# Patient Record
Sex: FEMALE | Race: WHITE | NOT HISPANIC OR LATINO | URBAN - NONMETROPOLITAN AREA
[De-identification: names, ages, dates, MRNs, and addresses within clinical notes are randomized per-mention and may not be internally consistent; named-entity substitution may affect disease eponyms.]

---

## 2017-01-18 NOTE — PATIENT DISCUSSION
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 NO CNV SEEN TODAY.  Recommend observation and continued close monitoring for progression to exudative AMD.

## 2017-01-18 NOTE — PROCEDURE NOTE: CLINICAL
PROCEDURE NOTE: Eylea 2mg OS. Diagnosis: Neovascular AMD with Active CNV. Anesthesia: Subconjunctival. Prep: Betadine Drops. Prior to injection, risks/benefits/alternatives discussed including infection, loss of vision, hemorrhage, cataract, glaucoma, retinal tears or detachment. The patient wished to proceed with treatment. Betadine prep was performed. Topical anesthesia was induced with Alcaine. Additional anesthesia was achieved using drop(s) or injection checked above. A drop of Povidone-iodine 5% ophthalmic solution was instilled over the injection site and in the inferior fornix. Using the syringe provided, Eylea 2.0mg in 0.05 cc was injected into the vitreous cavity. The needle was passed 3.0 mm posterior to the limbus in pseudophakic patients, and 3.5 mm posterior to the limbus in phakic patients. Patient tolerated procedure well. There were no complications. Injection time: 4:25 PM. The eye was irrigated with sterile irrigating solution. Post procedure instructions given. The patient was instructed to return for re-evaluation in approximately 4-12 weeks depending on his/her condition and was told to call immediately if vision decreases and/or if his/her eye becomes red, painful, and/or light sensitive. The patient was instructed to go to the emergency room or call 911 if unable to reach the doctor within an hour or two of trying or calling. The patient was instructed to use Artificial Tears q.i.d. p.r.n for comfort. Linnea Haynes

## 2017-01-18 NOTE — PATIENT DISCUSSION
Based on today's exam, diagnostic studies, and/or review of records, the determination was made for treatment today. EYLEA 2mg recommended TODAY AND AGAIN EVERY 10 WKS X 2 after discussion of benefits, risks and alternatives. The injection was given and tolerated well by the patient. Post-injection instructions were reviewed and understood by the patient. Procedure was performed without complications. Instructed to call immediately if any new distortion, blurring, decreased vision or eye pain.

## 2017-03-29 NOTE — PROCEDURE NOTE: CLINICAL
PROCEDURE NOTE: Eylea 2mg OS. Diagnosis: Neovascular AMD with Active CNV. Anesthesia: Topical. Prep: Betadine Drops. Prior to injection, risks/benefits/alternatives discussed including infection, loss of vision, hemorrhage, cataract, glaucoma, retinal tears or detachment. The patient wished to proceed with treatment. Betadine prep was performed. Topical anesthesia was induced with Alcaine. Additional anesthesia was achieved using drop(s) or injection checked above. A drop of Povidone-iodine 5% ophthalmic solution was instilled over the injection site and in the inferior fornix. Using the syringe provided, Eylea 2.0mg in 0.05 cc was injected into the vitreous cavity. The remainder of the Eylea in the single-use vial was then discarded in a medical waste disposal container. The needle was passed 3.0 mm posterior to the limbus in pseudophakic patients, and 3.5 mm posterior to the limbus in phakic patients. Patient tolerated procedure well. There were no complications. Injection time: 1246PM. The eye was irrigated with sterile irrigating solution. Post procedure instructions given. The patient was instructed to return for re-evaluation in approximately 4-12 weeks depending on his/her condition and was told to call immediately if vision decreases and/or if his/her eye becomes red, painful, and/or light sensitive. The patient was instructed to go to the emergency room or call 911 if unable to reach the doctor within an hour or two of trying or calling. The patient was instructed to use Artificial Tears q.i.d. p.r.n for comfort. Osmar Alanis

## 2017-06-07 NOTE — PROCEDURE NOTE: CLINICAL
PROCEDURE NOTE: Eylea 2mg OS. Diagnosis: Neovascular AMD with Active CNV. Anesthesia: Topical. Prep: Betadine Drops. Prior to injection, risks/benefits/alternatives discussed including infection, loss of vision, hemorrhage, cataract, glaucoma, retinal tears or detachment. The patient wished to proceed with treatment. Betadine prep was performed. Topical anesthesia was induced with Alcaine. Additional anesthesia was achieved using drop(s) or injection checked above. A drop of Povidone-iodine 5% ophthalmic solution was instilled over the injection site and in the inferior fornix. Using the syringe provided, Eylea 2.0mg in 0.05 cc was injected into the vitreous cavity. The remainder of the Eylea in the single-use vial was then discarded in a medical waste disposal container. The needle was passed 3.0 mm posterior to the limbus in pseudophakic patients, and 3.5 mm posterior to the limbus in phakic patients. Patient tolerated procedure well. There were no complications. Injection time: 4:00 PM. The eye was irrigated with sterile irrigating solution. Post procedure instructions given. The patient was instructed to return for re-evaluation in approximately 4-12 weeks depending on his/her condition and was told to call immediately if vision decreases and/or if his/her eye becomes red, painful, and/or light sensitive. The patient was instructed to go to the emergency room or call 911 if unable to reach the doctor within an hour or two of trying or calling. The patient was instructed to use Artificial Tears q.i.d. p.r.n for comfort. Mari Bales

## 2018-08-15 PROBLEM — H35.373: Noted: 2018-08-15

## 2018-08-15 PROBLEM — Z96.1: Noted: 2017-02-17

## 2018-08-15 PROBLEM — H34.11: Noted: 2017-02-17

## 2018-08-15 PROBLEM — E11.9: Noted: 2018-08-15

## 2019-08-16 ENCOUNTER — IMPORTED ENCOUNTER (OUTPATIENT)
Dept: URBAN - NONMETROPOLITAN AREA CLINIC 1 | Facility: CLINIC | Age: 79
End: 2019-08-16

## 2019-08-16 PROCEDURE — 92134 CPTRZ OPH DX IMG PST SGM RTA: CPT

## 2019-08-16 PROCEDURE — 92014 COMPRE OPH EXAM EST PT 1/>: CPT

## 2019-08-16 NOTE — PATIENT DISCUSSION
DM s NPDR -Stressed the importance of keeping blood sugars under control blood pressure under control and weight normalization and regular visits with PCP. -Explained the possible effects of poorly controlled diabetes and the damage that diabetes can cause to ocular health. -Patient to check HgbA1C.-Pt instructed to contact our office with any vision changes. 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 -Continue to monitor for now. Pt will notify us if any new symptoms develop.

## 2019-10-16 ENCOUNTER — OFFICE VISIT (OUTPATIENT)
Dept: UROLOGY | Age: 79
End: 2019-10-16

## 2019-10-16 VITALS
DIASTOLIC BLOOD PRESSURE: 76 MMHG | SYSTOLIC BLOOD PRESSURE: 185 MMHG | OXYGEN SATURATION: 95 % | HEIGHT: 61 IN | HEART RATE: 76 BPM | BODY MASS INDEX: 35.14 KG/M2

## 2019-10-16 DIAGNOSIS — N32.81 OAB (OVERACTIVE BLADDER): ICD-10-CM

## 2019-10-16 DIAGNOSIS — K62.3 INCOMPLETE RECTAL PROLAPSE: ICD-10-CM

## 2019-10-16 DIAGNOSIS — E66.01 SEVERE OBESITY (HCC): ICD-10-CM

## 2019-10-16 DIAGNOSIS — R30.0 DYSURIA: ICD-10-CM

## 2019-10-16 DIAGNOSIS — N81.6 RECTOCELE: ICD-10-CM

## 2019-10-16 DIAGNOSIS — K62.3 RP (RECTAL PROLAPSE): ICD-10-CM

## 2019-10-16 DIAGNOSIS — R35.0 FREQUENT URINATION: ICD-10-CM

## 2019-10-16 DIAGNOSIS — R35.1 NOCTURIA: Primary | ICD-10-CM

## 2019-10-16 LAB
BILIRUB UR QL STRIP: NEGATIVE
GLUCOSE UR-MCNC: NEGATIVE MG/DL
KETONES P FAST UR STRIP-MCNC: NEGATIVE MG/DL
PH UR STRIP: 5.5 [PH] (ref 4.6–8)
PROT UR QL STRIP: NORMAL
SP GR UR STRIP: 1.02 (ref 1–1.03)
UA UROBILINOGEN AMB POC: NORMAL (ref 0.2–1)
URINALYSIS CLARITY POC: CLEAR
URINALYSIS COLOR POC: YELLOW
URINE BLOOD POC: NORMAL
URINE LEUKOCYTES POC: NORMAL
URINE NITRITES POC: NEGATIVE

## 2019-10-16 RX ORDER — ALBUTEROL SULFATE 90 UG/1
1 AEROSOL, METERED RESPIRATORY (INHALATION)
COMMUNITY

## 2019-10-16 RX ORDER — ROSUVASTATIN CALCIUM 10 MG/1
10 TABLET, COATED ORAL
COMMUNITY

## 2019-10-16 NOTE — PROGRESS NOTES
Randall Partida 70 y.o. female     Ms. Maikol Howell seen today for evaluation of irritative voiding symptoms associated with bulging mass-effect in the region of introitus  Patient has history of recurrent UTIs with normal cystoscopic findings in 2019-Johnathan Yoon, [facility]    CT scan imaging of the abdomen and pelvis on 2019 shows normal kidneys ureters and bladder  IMPRESSION:  1. Copious stool suggests possible constipation; correlate with bowel   habits. 2. Large hiatus hernia. 3. Limited evaluation without IV contrast precludes definitive evaluation   of solid organs. Creatinine 0.7 in 2019    Colonoscopy in 2019 reportedly shows normal findings    Review of Systems:   CNS: No seizure syncope headaches dizziness or visual changes  Respiratory: No wheezing shortness of breath chest pain or coughing  Cardiovascular: Hypertension no angina no palpitations  Intestinal: GERD  Urinary: Urinary urgency frequency nocturia  Skeletal: Large joint arthritis  Endocrine: Diabetes  Other:                                                                                                      3 para 3  x3 max birth weight 8 pounds 3 ounces                                                                                                                  KIMBERLYN at age 41    Allergies: No Known Allergies   Medications:    Current Outpatient Medications   Medication Sig Dispense Refill    albuterol (PROAIR HFA) 90 mcg/actuation inhaler Take  by inhalation.  rosuvastatin (CRESTOR) 10 mg tablet Take 10 mg by mouth nightly.  ferrous sulfate 324 mg (65 mg iron) tablet Take  by mouth Daily (before breakfast).  insulin glargine (LANTUS U-100 INSULIN) 100 unit/mL injection by SubCUTAneous route nightly.  atenolol (TENORMIN) 25 mg tablet Take 25 mg by mouth daily.  furosemide (LASIX) 80 mg tablet Take  by mouth daily.       omeprazole (PRILOSEC) 40 mg capsule Take 40 mg by mouth daily.  HYDROcodone-acetaminophen (NORCO)  mg tablet Take 1 Tab by mouth.  warfarin (COUMADIN) 2 mg tablet Take 2 mg by mouth daily.  lisinopril (PRINIVIL, ZESTRIL) 10 mg tablet Take  by mouth daily.  fluticasone propion-salmeterol (ADVAIR DISKUS) 100-50 mcg/dose diskus inhaler Take 1 Puff by inhalation every twelve (12) hours.  adalimumab (HUMIRA PEN) 40 mg/0.8 mL injection pen by SubCUTAneous route once.  cholecalciferol, vitamin D3, (VITAMIN D3) 2,000 unit tab Take  by mouth.  ciprofloxacin HCl (CIPRO) 500 mg tablet Take 1 Tab by mouth two (2) times a day. 1 Tab 0    simvastatin (ZOCOR) 10 mg tablet Take  by mouth nightly.  tiotropium (SPIRIVA WITH HANDIHALER) 18 mcg inhalation capsule Take 1 Cap by inhalation daily.          Past Medical History:   Diagnosis Date    Asthma     CAD (coronary artery disease)     Diabetes ([facility] [address]     Headache     HTN (hypertension)     Hypercholesteremia     Leg cramps     on potassium lasix    Pulmonary emboli (HCC)     UTI (urinary tract infection)       Past Surgical History:   Procedure Laterality Date    HX BACK SURGERY      HX CARPAL TUNNEL RELEASE  04/22/2014    HX HYSTERECTOMY  1992    HX TUBAL LIGATION      laparoscopic     Social History     Socioeconomic History    Marital status: UNKNOWN     Spouse name: Not on file    Number of children: Not on file    Years of education: Not on file    Highest education level: Not on file   Occupational History    Not on file   Social Needs    Financial resource strain: Not on file    Food insecurity:     Worry: Not on file     Inability: Not on file    Transportation needs:     Medical: Not on file     Non-medical: Not on file   Tobacco Use    Smoking status: Former Smoker    Smokeless tobacco: Never Used   Substance and Sexual Activity    Alcohol use: Never     Frequency: Never    Drug use: Not on file    Sexual activity: Never   Lifestyle    Physical activity:     Days per week: Not on file     Minutes per session: Not on file    Stress: Not on file   Relationships    Social connections:     Talks on phone: Not on file     Gets together: Not on file     Attends [employer] service: Not on file     Active member of club or organization: Not on file     Attends meetings of clubs or organizations: Not on file     Relationship status: Not on file    Intimate partner violence:     Fear of current or ex partner: Not on file     Emotionally abused: Not on file     Physically abused: Not on file     Forced sexual activity: Not on file   Other Topics Concern    Not on file   Social History Narrative    Not on file      History reviewed. No pertinent family history. Physical Examination: Well-nourished mature female in no apparent distress-marked increased BMI    Abdomen is nontender   Back-no percussion CVA tenderness   Lower extremity motor and sensory function are normal  Speculum examination: Moderate sized rectocele with protrusion through introitus on Valsalva maneuver      urinalysis: +++hem negative nitritee/    Impression: Overactive bladder with  prolapsing rectocele        Plan: Referral to female urology-Dr. Rodriguez [facility]  -electronically signed-    PLEASE NOTE:  This document has been produced using voice recognition software. Unrecognized errors in transcription may be present.

## 2019-10-16 NOTE — PATIENT INSTRUCTIONS
Learning About Your Urinary System  What does your urinary system do? The urinary system is the network of organs and tubes that process and carry urine out of the body. The kidneys and ureters are called the upper urinary tract. The bladder and urethra are called the lower urinary tract. Your kidneys filter waste products and water from the blood to make urine. They also keep the proper balance of fluids and chemicals your body needs to work as it should. Tubes called ureters carry urine from the kidneys to the bladder. The bladder stores the urine. When the bladder is full, the urine leaves the body through another thin tube, called the urethra. What problems can happen with this system? Urinary problems include:  · A urinary tract infection, or UTI. This is an infection anywhere between the kidneys and the urethra. · Kidney stones. These are tiny stones that form in the kidneys. They can cause blood in the urine and severe pain. · Trouble urinating or not being able to urinate. This is often caused by an enlarged prostate in men. · Incontinence. This means you have trouble controlling the release of urine. Common symptoms of a urinary problem include:  · A burning feeling when you urinate. This is the most common symptom of a UTI. · An urgent need to urinate. · Blood in the urine. Your urine may look red, brown, or pink. · Incontinence. Treatment for urinary problems depends on the cause. Your doctor may treat an infection with an antibiotic. Or you may need a procedure to help a kidney stone pass. How can you prevent urinary problems? · Drink plenty of fluids, enough so that your urine is light yellow or clear like water. If you have kidney, heart, or liver disease and have to limit fluids, talk with your doctor before you increase the amount of fluids you drink. · Urinate when you need to. Tips for women  · Urinate right after you have sex. · Change sanitary pads often.   · Avoid douches, bubble baths, feminine hygiene sprays, and other feminine hygiene products that have deodorants. · After you use the toilet, wipe from front to back. Where can you learn more? Go to http://rubens-annmarie.info/. Enter I669 in the search box to learn more about \"Learning About Your Urinary System. \"  Current as of: December 19, 2018  Content Version: 12.2  © 3643-4663 [employer]. Care instructions adapted under license by [employer] (which disclaims liability or warranty for this information). If you have questions about a medical condition or this instruction, always ask your healthcare professional. [address] any warranty or liability for your use of this information.

## 2019-10-16 NOTE — PROGRESS NOTES
Ms. Kimber Stuart has a reminder for a \"due or due soon\" health maintenance. I have asked that she contact her primary care provider for follow-up on this health maintenance.

## 2019-10-17 ENCOUNTER — DOCUMENTATION ONLY (OUTPATIENT)
Dept: UROLOGY | Age: 79
End: 2019-10-17

## 2019-10-17 NOTE — PROGRESS NOTES
CT scan was canceled Per Dr. Canseco Brod he had found one from a couple months ago and said she did not need to get another one. Patient was notified.

## 2020-06-02 PROBLEM — K92.2 LOWER GI BLEED: Status: ACTIVE | Noted: 2020-06-02

## 2020-09-14 ENCOUNTER — IMPORTED ENCOUNTER (OUTPATIENT)
Dept: URBAN - NONMETROPOLITAN AREA CLINIC 1 | Facility: CLINIC | Age: 80
End: 2020-09-14

## 2020-09-14 PROBLEM — H35.373: Noted: 2020-10-12

## 2020-09-14 PROBLEM — H26.493: Noted: 2020-10-12

## 2020-09-14 PROBLEM — Z96.1: Noted: 2020-10-12

## 2020-09-14 PROBLEM — H34.11: Noted: 2020-10-12

## 2020-09-14 PROBLEM — E11.9: Noted: 2020-09-14

## 2020-09-14 PROCEDURE — 92014 COMPRE OPH EXAM EST PT 1/>: CPT

## 2020-09-14 NOTE — PATIENT DISCUSSION
DM s DR-Stressed the importance of keeping blood sugars under control blood pressure under control and weight normalization and regular visits with PCP. -Explained the possible effects of poorly controlled diabetes and the damage that diabetes can cause to ocular health. -Patient to check HgbA1C.-Pt instructed to contact our office with any vision changes. Old CRAO Discussed with patient continue to monitorEpiretinal membraneDiscussed findings of exam in detail with the patient. Discussed the signs of worsening of the disease. PC IOL OU Stable open OD PCO OSContinue to monitor; Dr's Notes:  Teagan Matson

## 2021-05-14 ENCOUNTER — IMPORTED ENCOUNTER (OUTPATIENT)
Dept: URBAN - NONMETROPOLITAN AREA CLINIC 1 | Facility: CLINIC | Age: 81
End: 2021-05-14

## 2021-05-14 PROBLEM — E11.9: Noted: 2021-05-14

## 2021-05-14 PROBLEM — H26.493: Noted: 2021-05-14

## 2021-05-14 PROBLEM — H35.373: Noted: 2021-05-14

## 2021-05-14 PROBLEM — Z96.1: Noted: 2021-05-14

## 2021-05-14 PROBLEM — H34.11: Noted: 2021-05-14

## 2021-05-14 PROBLEM — H16.142: Noted: 2021-05-14

## 2021-05-14 PROCEDURE — 92012 INTRM OPH EXAM EST PATIENT: CPT

## 2021-05-14 NOTE — PATIENT DISCUSSION
Punctate Keratitis OS-  discussed findings w/patient-  start Pred Forte TID OS x 1 week then RTC before starting taper-  continue Refresh BID OU-  RTC 1 week or prn; 's Notes:  Antione Bridges

## 2021-05-21 ENCOUNTER — IMPORTED ENCOUNTER (OUTPATIENT)
Dept: URBAN - NONMETROPOLITAN AREA CLINIC 1 | Facility: CLINIC | Age: 81
End: 2021-05-21

## 2021-05-21 PROCEDURE — 99212 OFFICE O/P EST SF 10 MIN: CPT

## 2021-05-21 NOTE — PATIENT DISCUSSION
Punctate Keratitis OS-  discussed findings w/patient-  much improved at this time-  taper PF 1% to BID OS x 1 week then QD OS x 1 week then d/c-  RTC as scheduled or prn

## 2021-09-22 ENCOUNTER — IMPORTED ENCOUNTER (OUTPATIENT)
Dept: URBAN - NONMETROPOLITAN AREA CLINIC 1 | Facility: CLINIC | Age: 81
End: 2021-09-22

## 2021-09-22 PROBLEM — H16.223: Noted: 2021-09-22

## 2021-09-22 PROBLEM — H35.373: Noted: 2021-09-22

## 2021-09-22 PROBLEM — H26.493: Noted: 2021-09-22

## 2021-09-22 PROBLEM — H34.11: Noted: 2021-09-22

## 2021-09-22 PROBLEM — E11.9: Noted: 2021-09-22

## 2021-09-22 PROBLEM — Z96.1: Noted: 2021-09-22

## 2021-09-22 PROBLEM — E11.9: Noted: 2021-10-26

## 2021-09-22 PROBLEM — H26.492: Noted: 2021-12-07

## 2021-09-22 PROBLEM — H26.493: Noted: 2021-10-26

## 2021-09-22 PROCEDURE — 92014 COMPRE OPH EXAM EST PT 1/>: CPT

## 2021-09-22 PROCEDURE — 92015 DETERMINE REFRACTIVE STATE: CPT

## 2021-09-22 NOTE — PATIENT DISCUSSION
KATINA mendoza Retinopathy -  discussed findings w/patient -  stressed importance of maintaining strict blood sugar control -  no BDR noted -  continue to monitor Pseudophakia OU -  both intraocular lenses are stable -  open capsule OD -  PCO noted OS order BAT at next visit -  continue to monitor ERM OU -  discussed findings w/patient -  discussed signs and symptoms associated -  order Mac OCT at next visit -  continue to monitor Hyperopia OS/Astigmatism OS w/PResbyopia OU -  discussed findings w/patient -  new spectacle Rx issued -  continue to monitor MJ OU -  discussed findings w/patient -  discussed signs and symptoms associated -  continue Refresh PRN OU -  continue to monitor; Dr's Notes: s/p NICHOLE ODMR 9/22/2021DFE 9/22/2021

## 2021-10-26 ENCOUNTER — IMPORTED ENCOUNTER (OUTPATIENT)
Dept: URBAN - NONMETROPOLITAN AREA CLINIC 1 | Facility: CLINIC | Age: 81
End: 2021-10-26

## 2021-10-26 PROCEDURE — 99214 OFFICE O/P EST MOD 30 MIN: CPT

## 2021-10-26 NOTE — PATIENT DISCUSSION
PCO-Explained PCO and RBAs of YAG Capsulotomy to pt. -Pt elects to proceed. YAG Caps OS n/a. Pt has bad claustophobia & did not want to wait to do YAG today.  Pt to RTC for n/a YAG PC OS NOTES BELOW FROM PREVIOUS DM sans Retinopathy -  discussed findings w/patient -  stressed importance of maintaining strict blood sugar control -  no BDR noted -  continue to monitor Pseudophakia OU -  both intraocular lenses are stable -  open capsule OD -  continue to monitor ERM OU -  discussed findings w/patient -  discussed signs and symptoms associated -  order Mac OCT at next visit -  continue to monitor Hyperopia OS/Astigmatism OS w/PResbyopia OU -  discussed findings w/patient -  new spectacle Rx issued -  continue to monitor MJ OU -  discussed findings w/patient -  discussed signs and symptoms associated -  continue Refresh PRN OU -  continue to monitor; Dr's Notes: s/p DANIELLEO ODMR 9/22/2021DFE 9/22/2021

## 2021-11-30 ENCOUNTER — PREPPED CHART (OUTPATIENT)
Dept: URBAN - NONMETROPOLITAN AREA CLINIC 4 | Facility: CLINIC | Age: 81
End: 2021-11-30

## 2021-11-30 ENCOUNTER — IMPORTED ENCOUNTER (OUTPATIENT)
Dept: URBAN - NONMETROPOLITAN AREA CLINIC 1 | Facility: CLINIC | Age: 81
End: 2021-11-30

## 2021-11-30 PROCEDURE — 66821 AFTER CATARACT LASER SURGERY: CPT

## 2021-11-30 NOTE — PATIENT DISCUSSION
PCO-Explained PCO and RBAs of YAG Capsulotomy to pt. -Pt elects to proceed.  YAG Caps OS todayconsent signed & obtained prior to procedure NOTES BELOW FROM PREVIOUS DM reji Retinopathy -  discussed findings w/patient -  stressed importance of maintaining strict blood sugar control -  no BDR noted -  continue to monitor Pseudophakia OU -  both intraocular lenses are stable -  open capsule OD -  continue to monitor ERM OU -  discussed findings w/patient -  discussed signs and symptoms associated -  order Mac OCT at next visit -  continue to monitor Hyperopia OS/Astigmatism OS w/PResbyopia OU -  discussed findings w/patient -  new spectacle Rx issued -  continue to monitor MJ OU -  discussed findings w/patient -  discussed signs and symptoms associated -  continue Refresh PRN OU -  continue to monitor; 's Notes: s/p NICHOLE ODMR 9/22/2021DFE 9/22/2021

## 2022-04-04 ASSESSMENT — VISUAL ACUITY
OS_PH: 20/30-2
OS_SC: 20/60+1

## 2022-04-04 ASSESSMENT — TONOMETRY
OS_IOP_MMHG: 14
OD_IOP_MMHG: 12

## 2022-04-06 ENCOUNTER — ESTABLISHED PATIENT (OUTPATIENT)
Dept: URBAN - NONMETROPOLITAN AREA CLINIC 4 | Facility: CLINIC | Age: 82
End: 2022-04-06

## 2022-04-06 DIAGNOSIS — H34.11: ICD-10-CM

## 2022-04-06 DIAGNOSIS — E11.9: ICD-10-CM

## 2022-04-06 DIAGNOSIS — H35.373: ICD-10-CM

## 2022-04-06 DIAGNOSIS — H52.02: ICD-10-CM

## 2022-04-06 DIAGNOSIS — H52.4: ICD-10-CM

## 2022-04-06 PROCEDURE — 99213 OFFICE O/P EST LOW 20 MIN: CPT

## 2022-04-06 PROCEDURE — 92134 CPTRZ OPH DX IMG PST SGM RTA: CPT

## 2022-04-06 PROCEDURE — 92015 DETERMINE REFRACTIVE STATE: CPT

## 2022-04-06 ASSESSMENT — VISUAL ACUITY: OS_SC: 20/70

## 2022-04-06 ASSESSMENT — TONOMETRY
OS_IOP_MMHG: 12
OD_IOP_MMHG: 14

## 2022-04-15 ASSESSMENT — VISUAL ACUITY
OS_SC: 20/40-1
OS_SC: 20/70
OS_CC: 20/70+2
OS_SC: 20/70
OS_GLARE: 20/100
OS_SC: 20/50
OU_CC: 20/20
OS_GLARE: 20/100
OU_SC: 20/70
OU_CC: 20/20
OS_PH: 20/30-2
OU_SC: 20/50
OS_PH: 20/60

## 2022-04-15 ASSESSMENT — TONOMETRY
OD_IOP_MMHG: 12
OS_IOP_MMHG: 14
OD_IOP_MMHG: 12
OS_IOP_MMHG: 14
OS_IOP_MMHG: 12
OS_IOP_MMHG: 14
OS_IOP_MMHG: 10
OD_IOP_MMHG: 16
OS_IOP_MMHG: 14
OD_IOP_MMHG: 13
OD_IOP_MMHG: 12
OD_IOP_MMHG: 10

## 2022-09-27 ENCOUNTER — HOSPITAL ENCOUNTER (INPATIENT)
Age: 82
LOS: 6 days | Discharge: HOME HEALTH | DRG: 682 | End: 2022-10-03
Attending: INTERNAL MEDICINE | Admitting: INTERNAL MEDICINE
Payer: MEDICARE

## 2022-09-27 DIAGNOSIS — R06.02 SHORTNESS OF BREATH: Primary | ICD-10-CM

## 2022-09-27 PROBLEM — I50.9 ACUTE CHF (CONGESTIVE HEART FAILURE) (HCC): Status: ACTIVE | Noted: 2022-09-27

## 2022-09-27 PROBLEM — N17.9 AKI (ACUTE KIDNEY INJURY) (HCC): Status: ACTIVE | Noted: 2022-09-27

## 2022-09-27 LAB
ANION GAP SERPL CALC-SCNC: 5 MMOL/L (ref 3–18)
APTT PPP: 32.4 SEC (ref 23–36.4)
BASOPHILS # BLD: 0 K/UL (ref 0–0.1)
BASOPHILS NFR BLD: 0 % (ref 0–2)
BUN SERPL-MCNC: 70 MG/DL (ref 7–18)
BUN/CREAT SERPL: 23 (ref 12–20)
CALCIUM SERPL-MCNC: 9.2 MG/DL (ref 8.5–10.1)
CHLORIDE SERPL-SCNC: 104 MMOL/L (ref 100–111)
CO2 SERPL-SCNC: 25 MMOL/L (ref 21–32)
CREAT SERPL-MCNC: 2.98 MG/DL (ref 0.6–1.3)
DIFFERENTIAL METHOD BLD: ABNORMAL
EOSINOPHIL # BLD: 0.3 K/UL (ref 0–0.4)
EOSINOPHIL NFR BLD: 3 % (ref 0–5)
ERYTHROCYTE [DISTWIDTH] IN BLOOD BY AUTOMATED COUNT: 14.1 % (ref 11.6–14.5)
EST. AVERAGE GLUCOSE BLD GHB EST-MCNC: 128 MG/DL
GLUCOSE BLD STRIP.AUTO-MCNC: 133 MG/DL (ref 70–110)
GLUCOSE SERPL-MCNC: 152 MG/DL (ref 74–99)
HBA1C MFR BLD: 6.1 % (ref 4.2–5.6)
HCT VFR BLD AUTO: 41.8 % (ref 35–45)
HGB BLD-MCNC: 13.1 G/DL (ref 12–16)
IMM GRANULOCYTES # BLD AUTO: 0 K/UL (ref 0–0.04)
IMM GRANULOCYTES NFR BLD AUTO: 0 % (ref 0–0.5)
LYMPHOCYTES # BLD: 1.6 K/UL (ref 0.9–3.6)
LYMPHOCYTES NFR BLD: 21 % (ref 21–52)
MAGNESIUM SERPL-MCNC: 2.2 MG/DL (ref 1.6–2.6)
MCH RBC QN AUTO: 29.8 PG (ref 24–34)
MCHC RBC AUTO-ENTMCNC: 31.3 G/DL (ref 31–37)
MCV RBC AUTO: 95.2 FL (ref 78–100)
MONOCYTES # BLD: 1.2 K/UL (ref 0.05–1.2)
MONOCYTES NFR BLD: 16 % (ref 3–10)
NEUTS SEG # BLD: 4.4 K/UL (ref 1.8–8)
NEUTS SEG NFR BLD: 59 % (ref 40–73)
NRBC # BLD: 0 K/UL (ref 0–0.01)
NRBC BLD-RTO: 0 PER 100 WBC
PLATELET # BLD AUTO: 247 K/UL (ref 135–420)
PMV BLD AUTO: 9.8 FL (ref 9.2–11.8)
POTASSIUM SERPL-SCNC: 5.1 MMOL/L (ref 3.5–5.5)
RBC # BLD AUTO: 4.39 M/UL (ref 4.2–5.3)
SODIUM SERPL-SCNC: 134 MMOL/L (ref 136–145)
WBC # BLD AUTO: 7.4 K/UL (ref 4.6–13.2)

## 2022-09-27 PROCEDURE — 77030018842 HC SOL IRR SOD CL 9% BAXT -A

## 2022-09-27 PROCEDURE — 74011000250 HC RX REV CODE- 250: Performed by: INTERNAL MEDICINE

## 2022-09-27 PROCEDURE — 80048 BASIC METABOLIC PNL TOTAL CA: CPT

## 2022-09-27 PROCEDURE — 99223 1ST HOSP IP/OBS HIGH 75: CPT | Performed by: INTERNAL MEDICINE

## 2022-09-27 PROCEDURE — 65270000046 HC RM TELEMETRY

## 2022-09-27 PROCEDURE — 2709999900 HC NON-CHARGEABLE SUPPLY

## 2022-09-27 PROCEDURE — 82962 GLUCOSE BLOOD TEST: CPT

## 2022-09-27 PROCEDURE — 36415 COLL VENOUS BLD VENIPUNCTURE: CPT

## 2022-09-27 PROCEDURE — 74011250637 HC RX REV CODE- 250/637: Performed by: INTERNAL MEDICINE

## 2022-09-27 PROCEDURE — 85025 COMPLETE CBC W/AUTO DIFF WBC: CPT

## 2022-09-27 PROCEDURE — 94761 N-INVAS EAR/PLS OXIMETRY MLT: CPT

## 2022-09-27 PROCEDURE — 74011250636 HC RX REV CODE- 250/636: Performed by: INTERNAL MEDICINE

## 2022-09-27 PROCEDURE — 83735 ASSAY OF MAGNESIUM: CPT

## 2022-09-27 PROCEDURE — 74011636637 HC RX REV CODE- 636/637: Performed by: INTERNAL MEDICINE

## 2022-09-27 PROCEDURE — 94640 AIRWAY INHALATION TREATMENT: CPT

## 2022-09-27 PROCEDURE — 85730 THROMBOPLASTIN TIME PARTIAL: CPT

## 2022-09-27 PROCEDURE — 83036 HEMOGLOBIN GLYCOSYLATED A1C: CPT

## 2022-09-27 RX ORDER — SODIUM CHLORIDE 0.9 % (FLUSH) 0.9 %
5-40 SYRINGE (ML) INJECTION AS NEEDED
Status: DISCONTINUED | OUTPATIENT
Start: 2022-09-27 | End: 2022-10-03 | Stop reason: HOSPADM

## 2022-09-27 RX ORDER — ROSUVASTATIN CALCIUM 10 MG/1
10 TABLET, COATED ORAL
Status: DISCONTINUED | OUTPATIENT
Start: 2022-09-27 | End: 2022-10-03 | Stop reason: HOSPADM

## 2022-09-27 RX ORDER — HEPARIN SODIUM 10000 [USP'U]/100ML
18-36 INJECTION, SOLUTION INTRAVENOUS
Status: DISCONTINUED | OUTPATIENT
Start: 2022-09-27 | End: 2022-09-28

## 2022-09-27 RX ORDER — ACETAMINOPHEN 325 MG/1
650 TABLET ORAL
Status: DISCONTINUED | OUTPATIENT
Start: 2022-09-27 | End: 2022-10-03 | Stop reason: HOSPADM

## 2022-09-27 RX ORDER — INSULIN LISPRO 100 [IU]/ML
INJECTION, SOLUTION INTRAVENOUS; SUBCUTANEOUS
Status: DISCONTINUED | OUTPATIENT
Start: 2022-09-27 | End: 2022-10-03 | Stop reason: HOSPADM

## 2022-09-27 RX ORDER — HYDRALAZINE HYDROCHLORIDE 50 MG/1
50 TABLET, FILM COATED ORAL 3 TIMES DAILY
Status: DISCONTINUED | OUTPATIENT
Start: 2022-09-27 | End: 2022-10-03 | Stop reason: HOSPADM

## 2022-09-27 RX ORDER — PROMETHAZINE HYDROCHLORIDE 12.5 MG/1
12.5 TABLET ORAL
Status: DISCONTINUED | OUTPATIENT
Start: 2022-09-27 | End: 2022-10-03 | Stop reason: HOSPADM

## 2022-09-27 RX ORDER — HYDRALAZINE HYDROCHLORIDE 50 MG/1
50 TABLET, FILM COATED ORAL 3 TIMES DAILY
COMMUNITY
Start: 2022-09-16

## 2022-09-27 RX ORDER — IPRATROPIUM BROMIDE AND ALBUTEROL SULFATE 2.5; .5 MG/3ML; MG/3ML
3 SOLUTION RESPIRATORY (INHALATION)
Status: DISCONTINUED | OUTPATIENT
Start: 2022-09-27 | End: 2022-10-03 | Stop reason: HOSPADM

## 2022-09-27 RX ORDER — ATENOLOL 25 MG/1
25 TABLET ORAL DAILY
Status: DISCONTINUED | OUTPATIENT
Start: 2022-09-28 | End: 2022-10-03 | Stop reason: HOSPADM

## 2022-09-27 RX ORDER — ARFORMOTEROL TARTRATE 15 UG/2ML
15 SOLUTION RESPIRATORY (INHALATION)
Status: DISCONTINUED | OUTPATIENT
Start: 2022-09-27 | End: 2022-09-29

## 2022-09-27 RX ORDER — AMLODIPINE BESYLATE 10 MG/1
10 TABLET ORAL DAILY
COMMUNITY
Start: 2022-09-16 | End: 2022-10-03

## 2022-09-27 RX ORDER — POLYETHYLENE GLYCOL 3350 17 G/17G
17 POWDER, FOR SOLUTION ORAL DAILY PRN
Status: DISCONTINUED | OUTPATIENT
Start: 2022-09-27 | End: 2022-10-03 | Stop reason: HOSPADM

## 2022-09-27 RX ORDER — MAGNESIUM SULFATE 100 %
4 CRYSTALS MISCELLANEOUS AS NEEDED
Status: DISCONTINUED | OUTPATIENT
Start: 2022-09-27 | End: 2022-10-03 | Stop reason: HOSPADM

## 2022-09-27 RX ORDER — BUDESONIDE 0.5 MG/2ML
500 INHALANT ORAL
Status: DISCONTINUED | OUTPATIENT
Start: 2022-09-27 | End: 2022-09-29

## 2022-09-27 RX ORDER — SODIUM CHLORIDE 0.9 % (FLUSH) 0.9 %
5-40 SYRINGE (ML) INJECTION EVERY 8 HOURS
Status: DISCONTINUED | OUTPATIENT
Start: 2022-09-27 | End: 2022-10-03 | Stop reason: HOSPADM

## 2022-09-27 RX ORDER — ONDANSETRON 2 MG/ML
4 INJECTION INTRAMUSCULAR; INTRAVENOUS
Status: DISCONTINUED | OUTPATIENT
Start: 2022-09-27 | End: 2022-10-03 | Stop reason: HOSPADM

## 2022-09-27 RX ORDER — ISOSORBIDE DINITRATE 20 MG/1
20 TABLET ORAL 3 TIMES DAILY
COMMUNITY
Start: 2022-09-16

## 2022-09-27 RX ORDER — PANTOPRAZOLE SODIUM 40 MG/1
40 TABLET, DELAYED RELEASE ORAL
Status: DISCONTINUED | OUTPATIENT
Start: 2022-09-28 | End: 2022-10-03 | Stop reason: HOSPADM

## 2022-09-27 RX ORDER — ISOSORBIDE DINITRATE 10 MG/1
20 TABLET ORAL 3 TIMES DAILY
Status: DISCONTINUED | OUTPATIENT
Start: 2022-09-27 | End: 2022-10-03 | Stop reason: HOSPADM

## 2022-09-27 RX ORDER — INSULIN GLARGINE 100 [IU]/ML
15 INJECTION, SOLUTION SUBCUTANEOUS 2 TIMES DAILY
Status: DISCONTINUED | OUTPATIENT
Start: 2022-09-27 | End: 2022-09-28

## 2022-09-27 RX ADMIN — HYDRALAZINE HYDROCHLORIDE 50 MG: 50 TABLET, FILM COATED ORAL at 22:02

## 2022-09-27 RX ADMIN — BUDESONIDE 500 MCG: 0.5 SUSPENSION RESPIRATORY (INHALATION) at 20:35

## 2022-09-27 RX ADMIN — SODIUM CHLORIDE, PRESERVATIVE FREE 10 ML: 5 INJECTION INTRAVENOUS at 22:04

## 2022-09-27 RX ADMIN — Medication 15 UNITS: at 22:07

## 2022-09-27 RX ADMIN — HEPARIN SODIUM 18 UNITS/KG/HR: 10000 INJECTION, SOLUTION INTRAVENOUS at 21:50

## 2022-09-27 RX ADMIN — ROSUVASTATIN CALCIUM 10 MG: 10 TABLET, FILM COATED ORAL at 22:02

## 2022-09-27 RX ADMIN — IPRATROPIUM BROMIDE AND ALBUTEROL SULFATE 3 ML: .5; 3 SOLUTION RESPIRATORY (INHALATION) at 20:35

## 2022-09-27 RX ADMIN — ISOSORBIDE DINITRATE 20 MG: 10 TABLET ORAL at 22:02

## 2022-09-27 RX ADMIN — ARFORMOTEROL TARTRATE 15 MCG: 15 SOLUTION RESPIRATORY (INHALATION) at 20:35

## 2022-09-27 NOTE — H&P
History & Physical    Patient: Ryan Kwong MRN: [id number]  CSN: [id number]    YOB: 1940  Age: 80 y.o. Sex: female      DOA: 9/27/2022  CC: shortness of breath    PCP: Hollie Rodriguez MD       HPI:     Ryan Kwong is a 80 y.o. female with medical co-morbidities including hypertension, hyperlipidemia, chronic CHF unspecified, COPD, h/o PE/DVT on Eliquis, current IVC filter, type 2 diabetes mellitus, psoriasis on Humira, presented to [facility] with worsening shortness of breath. According to her, she had shortness of breath which started a few weeks ago that seem to have worsened in the last few days. Her shortness of breath seemed to be worse when she exerted herself. She has no chest pain, she has no associated leg swelling. She stated that she has been compliant with all her medications. However, she has noted that the bottle of Lasix was hidden behind the  the last 3 weeks and she has not realized it was missing, Hence, her Lasix dose was not filled in her medication box. Otherwise, she have not noticed any changes in her leg swelling or urinary output. She does follow-up with her nephrologist regularly with indication that her kidney function has been stable. In the ED at the [facility], she was found to have elevated creatinine (4.13) with a depressed GFR, elevated BUN (67), elevated potassium. Normal troponin. Respiratory panel was negative. CBC was within normal limit. Chest x-ray with pulmonary vascular congestion. Duplex of her legs showed nonoccluding DVT within the left popliteal vein. She was given heparin drip. She was planned for transfer to [facility] for further evaluation of her CHF and REMY. She was given Lasix at the advice of the transferring MD.  She has sent tolerated well and improve in her respiratory symptom.         Review of Systems  GENERAL: No fever, No chill, + malaise   HEENT: No change in vision, no sore throat or sinus congestion. NECK: No pain or stiffness. PULMONARY:++ Shortness of breath, no cough or wheeze. Cardiovascular: no pnd / orthopnea, no Chest Pain  GASTROINTESTINAL: No abd pain, No nausea/vomiting, No diarrhea, No melena or bright red blood per rectum. GENITOURINARY: No urinary frequency, No urgency or pain with urination. MUSCULOSKELETAL: No joint or muscle pain, no back pain, no recent trauma. DERMATOLOGIC: No rash, no itching, no lesions. ENDOCRINE: No polyuria, polydipsia,  No recent change in weight. HEMATOLOGICAL: No easy bruising or bleeding. NEUROLOGIC: No headache, No seizures, No generalized weakness         Past Medical History:   Diagnosis Date    Asthma     CAD (coronary artery disease)     Diabetes ([address]     Headache     HTN (hypertension)     Hypercholesteremia     Leg cramps     on potassium lasix    Pulmonary emboli (HCC)     UTI (urinary tract infection)        Past Surgical History:   Procedure Laterality Date    COLONOSCOPY N/A 6/5/2020    COLONOSCOPY WITH BXS performed by Arin Mancia MD at [address] [address]  04/22/2014    HX HYSTERECTOMY  1992    HX TUBAL LIGATION      laparoscopic    IR [facility] IVC FILTER  6/10/2020       No family history on file. Social History     Socioeconomic History    Marital status:    Tobacco Use    Smoking status: Former    Smokeless tobacco: Never   Substance and Sexual Activity    Alcohol use: Never    Sexual activity: Never       Prior to Admission medications    Medication Sig Start Date End Date Taking? Authorizing Provider   amLODIPine (NORVASC) 10 mg tablet Take 10 mg by mouth daily. 9/16/22  Yes Provider, Historical   hydrALAZINE (APRESOLINE) 50 mg tablet Take 50 mg by mouth three (3) times daily. 9/16/22  Yes Provider, Historical   isosorbide dinitrate (ISORDIL) 20 mg tablet Take 20 mg by mouth three (3) times daily.  9/16/22  Yes Provider, Historical   apixaban (ELIQUIS) 5 mg tablet Take 1 Tab by mouth two (2) times a day. 6/16/20  Yes Yang Kumar MD   albuterol-ipratropium (DUO-NEB) 2.5 mg-0.5 mg/3 ml nebu 3 mL by Nebulization route every six (6) hours as needed for Wheezing. 6/16/20  Yes Yang Kumar MD   ergocalciferol (ERGOCALCIFEROL) 50,000 unit capsule Vitamin D2 1,250 mcg (50,000 unit) capsule   Yes Provider, Historical   albuterol (PROVENTIL HFA, VENTOLIN HFA, PROAIR HFA) 90 mcg/actuation inhaler Take 1 Puff by inhalation every six (6) hours as needed. Yes Provider, Historical   rosuvastatin (CRESTOR) 10 mg tablet Take 10 mg by mouth nightly. Yes Provider, Historical   insulin glargine (LANTUS) 100 unit/mL injection by SubCUTAneous route nightly. 26 units in AM, 15 units in PM   Yes Provider, Historical   atenolol (TENORMIN) 25 mg tablet Take 25 mg by mouth daily. Yes Provider, Historical   furosemide (LASIX) 40 mg tablet Take 40 mg by mouth daily. Yes Provider, Historical   omeprazole (PRILOSEC) 20 mg capsule Take 20 mg by mouth daily. Yes Provider, Historical   HYDROcodone-acetaminophen (NORCO)  mg tablet Take 1 Tablet by mouth every eight (8) hours as needed. Yes Provider, Historical   lisinopril (PRINIVIL, ZESTRIL) 10 mg tablet Take  by mouth daily. Yes Provider, Historical   adalimumab (HUMIRA) 40 mg/0.8 mL injection pen 40 mg by SubCUTAneous route every seven (7) days. Yes Provider, Historical   tiotropium (SPIRIVA) 18 mcg inhalation capsule Take 1 Cap by inhalation daily. Yes Provider, Historical   FREESTYLE LITE STRIPS strip  11/30/19   Provider, Historical   FREESTYLE LANCETS 28 gauge misc  11/30/19   Provider, Historical   fluticasone propion-salmeteroL (ADVAIR/WIXELA) 100-50 mcg/dose diskus inhaler Take 1 Puff by inhalation every twelve (12) hours.     Provider, Historical       No Known Allergies           Physical Exam:      Visit Vitals  BP (!) 148/75 (BP 1 Location: Right lower arm, BP Patient Position: At rest) Pulse 70   Temp 97.8 °F (36.6 °C)   Resp 17   Ht 5' 3\" (1.6 m)   Wt 79.3 kg (174 lb 13.2 oz)   SpO2 99%   BMI 30.97 kg/m²       Physical Exam:  Tele:   General:  Cooperative, Not in acute distress, speaks in short sentence while in bed  HEENT: PERRL, EOMI, supple neck, + JVD, dry oral mucosa  Cardiovascular: S1S2 regular, no rub/gallop   Pulmonary: air entry bilaterally, no wheezing, ++ crackle  GI:  Soft, non tender, non distended, +bs, no guarding   Extremities: + Pedal edema, +distal pulses appreciated   Neuro: AOx3, moving all extremities, no gross deficit. Lab/Data Review:  Labs: Results:       Chemistry Recent Labs     09/27/22 1908   *   *   K 5.1      CO2 25   BUN 70*   CREA 2.98*   CA 9.2   AGAP 5   BUCR 23*      CBC w/Diff Recent Labs     09/27/22 1908   WBC 7.4   RBC 4.39   HGB 13.1   HCT 41.8      GRANS 59   LYMPH 21   EOS 3      Coagulation Recent Labs     09/27/22 1908   APTT 32.4       Iron/Ferritin No results for input(s): IRON in the last 72 hours. No lab exists for component: TIBCCALC   BNP No results for input(s): BNPP in the last 72 hours. Cardiac Enzymes No results for input(s): CPK, CKND1, ISRAEL in the last 72 hours. No lab exists for component: CKRMB, TROIP   Liver Enzymes No results for input(s): TP, ALB, TBIL, AP in the last 72 hours. No lab exists for component: SGOT, GPT, DBIL   Thyroid Studies No results found for: T4, T3U, TSH, TSHEXT, TSHEXT       All Micro Results       None            Imaging Reviewed:  CXR 9/26/22  Impression    IMPRESSION:     1. Borderline cardiac silhouette enlargement and central pulmonary vascular congestion. Moderate hiatal hernia. Duplex of legs: 9/26/22  Impression    IMPRESSION:     1. Nonocclusive deep venous thrombosis focally noted within left popliteal vein. The right lower extremity is free of thrombus. Assessment:   Active Problems:    Acute on chronic CHF, likely HFpEF.   Patient has normal EF 60% in June/8/2022 per cardiac cath. Hypertension  CAD, normal LHC and LV angiogram June 8, 2022. History of PE/DVT on chronic Eliquis, she also had an IVC filter in place. COPD/asthma, not in exacerbation  Obesity BMI 30  Type 2 diabetes mellitus with hyperglycemia  Hyperlipidemia  REMY on CKD 4, baseline creatinine 2.8-2.5  Psoriasis on chronic Humira      Plan:     She has been transferred from [facility] to [facility] for further evaluation and treatment of CHF + REMY. Her overall CHF/volume overload was due to missed diuretic dose in the last months. She inadvertently missed her Lasix dose due to it being hidden behind the drugs drawer. She is overall tolerate IV Lasix twice daily, continue to monitor I/O, low-salt diet. Echocardiogram follow-up. She can be resumed on guideline directed medical therapy that she has been on. Cardiology consult tomorrow  As for her renal pathology, cardio renal phenomenon is likely. With diuretic her current creatinine has improved. Ultrasound of renal tomorrow. Nephrology consult follow-up  She can be resumed on heparin drip for now, if her renal function recover then she can be placed back on Eliquis. Resume bronchodilator, Advair converted  Resume Lantus + ISS  Pepcid  PT OT  Wean her off home oxygen as tolerated    Risk of deterioration:  []Low    [x]Moderate  []High     Prophylaxis:  []Lovenox  []Coumadin  [x]Hep gtt.  []SCDs  [x]H2B/PPI     Disposition:  []Home w/ Family   [x] PT,OT,RN   []SNF/LTC   []SAH/Rehab     Discussed Code Status:         []Full Code      []DNR         ___________________________________________________     Care Plan discussed with:    [x]Patient   []Family    []ED Care Manager  [x]ED Doc   [x]Specialist :  Total Time Coordinating Admission:   70   minutes    []Total Critical Care Time:       Jese Adams MD  9/27/2022, 7:25 PM

## 2022-09-28 ENCOUNTER — APPOINTMENT (OUTPATIENT)
Dept: GENERAL RADIOLOGY | Age: 82
DRG: 682 | End: 2022-09-28
Attending: INTERNAL MEDICINE
Payer: MEDICARE

## 2022-09-28 ENCOUNTER — APPOINTMENT (OUTPATIENT)
Dept: ULTRASOUND IMAGING | Age: 82
DRG: 682 | End: 2022-09-28
Attending: INTERNAL MEDICINE
Payer: MEDICARE

## 2022-09-28 ENCOUNTER — APPOINTMENT (OUTPATIENT)
Dept: NON INVASIVE DIAGNOSTICS | Age: 82
DRG: 682 | End: 2022-09-28
Attending: PHYSICIAN ASSISTANT
Payer: MEDICARE

## 2022-09-28 LAB
ANION GAP SERPL CALC-SCNC: 5 MMOL/L (ref 3–18)
APTT PPP: 148.9 SEC (ref 23–36.4)
APTT PPP: 156.3 SEC (ref 23–36.4)
BASOPHILS # BLD: 0 K/UL (ref 0–0.1)
BASOPHILS NFR BLD: 1 % (ref 0–2)
BNP SERPL-MCNC: 328 PG/ML (ref 0–1800)
BUN SERPL-MCNC: 68 MG/DL (ref 7–18)
BUN/CREAT SERPL: 25 (ref 12–20)
CALCIUM SERPL-MCNC: 8.9 MG/DL (ref 8.5–10.1)
CHLORIDE SERPL-SCNC: 106 MMOL/L (ref 100–111)
CO2 SERPL-SCNC: 26 MMOL/L (ref 21–32)
CREAT SERPL-MCNC: 2.69 MG/DL (ref 0.6–1.3)
DIFFERENTIAL METHOD BLD: ABNORMAL
ECHO AO ROOT DIAM: 2.6 CM
ECHO AO ROOT INDEX: 1.43 CM/M2
ECHO LA VOL 2C: 43 ML (ref 22–52)
ECHO LA VOL 4C: 75 ML (ref 22–52)
ECHO LA VOLUME AREA LENGTH: 60 ML
ECHO LA VOLUME INDEX A2C: 24 ML/M2 (ref 16–34)
ECHO LA VOLUME INDEX A4C: 41 ML/M2 (ref 16–34)
ECHO LA VOLUME INDEX AREA LENGTH: 33 ML/M2 (ref 16–34)
ECHO LV E' LATERAL VELOCITY: 6 CM/S
ECHO LV E' SEPTAL VELOCITY: 5 CM/S
ECHO LV FRACTIONAL SHORTENING: 30 % (ref 28–44)
ECHO LV INTERNAL DIMENSION DIASTOLE INDEX: 2.42 CM/M2
ECHO LV INTERNAL DIMENSION DIASTOLIC: 4.4 CM (ref 3.9–5.3)
ECHO LV INTERNAL DIMENSION SYSTOLIC INDEX: 1.7 CM/M2
ECHO LV INTERNAL DIMENSION SYSTOLIC: 3.1 CM
ECHO LV IVSD: 1.2 CM (ref 0.6–0.9)
ECHO LV MASS 2D: 180 G (ref 67–162)
ECHO LV MASS INDEX 2D: 98.9 G/M2 (ref 43–95)
ECHO LV POSTERIOR WALL DIASTOLIC: 1.1 CM (ref 0.6–0.9)
ECHO LV RELATIVE WALL THICKNESS RATIO: 0.5
ECHO LVOT AREA: 2.5 CM2
ECHO LVOT DIAM: 1.8 CM
ECHO LVOT MEAN GRADIENT: 2 MMHG
ECHO LVOT PEAK GRADIENT: 5 MMHG
ECHO LVOT PEAK VELOCITY: 1.1 M/S
ECHO LVOT STROKE VOLUME INDEX: 33.3 ML/M2
ECHO LVOT SV: 60.5 ML
ECHO LVOT VTI: 23.8 CM
ECHO MV A VELOCITY: 0.89 M/S
ECHO MV E DECELERATION TIME (DT): 231.1 MS
ECHO MV E VELOCITY: 0.82 M/S
ECHO MV E/A RATIO: 0.92
ECHO MV E/E' LATERAL: 13.67
ECHO MV E/E' RATIO (AVERAGED): 15.03
ECHO MV E/E' SEPTAL: 16.4
ECHO PULMONARY ARTERY SYSTOLIC PRESSURE (PASP): 29 MMHG
ECHO RV FREE WALL PEAK S': 14 CM/S
ECHO RV TAPSE: 2.6 CM (ref 1.7–?)
ECHO TV REGURGITANT MAX VELOCITY: 2.65 M/S
ECHO TV REGURGITANT PEAK GRADIENT: 28 MMHG
EOSINOPHIL # BLD: 0.2 K/UL (ref 0–0.4)
EOSINOPHIL NFR BLD: 3 % (ref 0–5)
ERYTHROCYTE [DISTWIDTH] IN BLOOD BY AUTOMATED COUNT: 14.2 % (ref 11.6–14.5)
GLUCOSE BLD STRIP.AUTO-MCNC: 120 MG/DL (ref 70–110)
GLUCOSE BLD STRIP.AUTO-MCNC: 130 MG/DL (ref 70–110)
GLUCOSE BLD STRIP.AUTO-MCNC: 143 MG/DL (ref 70–110)
GLUCOSE BLD STRIP.AUTO-MCNC: 166 MG/DL (ref 70–110)
GLUCOSE SERPL-MCNC: 132 MG/DL (ref 74–99)
HCT VFR BLD AUTO: 35.5 % (ref 35–45)
HGB BLD-MCNC: 11.3 G/DL (ref 12–16)
IMM GRANULOCYTES # BLD AUTO: 0 K/UL (ref 0–0.04)
IMM GRANULOCYTES NFR BLD AUTO: 1 % (ref 0–0.5)
LYMPHOCYTES # BLD: 1.3 K/UL (ref 0.9–3.6)
LYMPHOCYTES NFR BLD: 21 % (ref 21–52)
MAGNESIUM SERPL-MCNC: 2.1 MG/DL (ref 1.6–2.6)
MCH RBC QN AUTO: 30.4 PG (ref 24–34)
MCHC RBC AUTO-ENTMCNC: 31.8 G/DL (ref 31–37)
MCV RBC AUTO: 95.4 FL (ref 78–100)
MONOCYTES # BLD: 1.1 K/UL (ref 0.05–1.2)
MONOCYTES NFR BLD: 17 % (ref 3–10)
NEUTS SEG # BLD: 3.6 K/UL (ref 1.8–8)
NEUTS SEG NFR BLD: 58 % (ref 40–73)
NRBC # BLD: 0 K/UL (ref 0–0.01)
NRBC BLD-RTO: 0 PER 100 WBC
PLATELET # BLD AUTO: 214 K/UL (ref 135–420)
PMV BLD AUTO: 9.7 FL (ref 9.2–11.8)
POTASSIUM SERPL-SCNC: 5 MMOL/L (ref 3.5–5.5)
RBC # BLD AUTO: 3.72 M/UL (ref 4.2–5.3)
SODIUM SERPL-SCNC: 137 MMOL/L (ref 136–145)
WBC # BLD AUTO: 6.2 K/UL (ref 4.6–13.2)

## 2022-09-28 PROCEDURE — 85730 THROMBOPLASTIN TIME PARTIAL: CPT

## 2022-09-28 PROCEDURE — 84300 ASSAY OF URINE SODIUM: CPT

## 2022-09-28 PROCEDURE — 36415 COLL VENOUS BLD VENIPUNCTURE: CPT

## 2022-09-28 PROCEDURE — 99223 1ST HOSP IP/OBS HIGH 75: CPT | Performed by: INTERNAL MEDICINE

## 2022-09-28 PROCEDURE — 99232 SBSQ HOSP IP/OBS MODERATE 35: CPT | Performed by: HOSPITALIST

## 2022-09-28 PROCEDURE — 74011250636 HC RX REV CODE- 250/636: Performed by: INTERNAL MEDICINE

## 2022-09-28 PROCEDURE — 74011250637 HC RX REV CODE- 250/637: Performed by: INTERNAL MEDICINE

## 2022-09-28 PROCEDURE — 76770 US EXAM ABDO BACK WALL COMP: CPT

## 2022-09-28 PROCEDURE — 83735 ASSAY OF MAGNESIUM: CPT

## 2022-09-28 PROCEDURE — 87186 SC STD MICRODIL/AGAR DIL: CPT

## 2022-09-28 PROCEDURE — 71045 X-RAY EXAM CHEST 1 VIEW: CPT

## 2022-09-28 PROCEDURE — 65270000046 HC RM TELEMETRY

## 2022-09-28 PROCEDURE — 83880 ASSAY OF NATRIURETIC PEPTIDE: CPT

## 2022-09-28 PROCEDURE — 87077 CULTURE AEROBIC IDENTIFY: CPT

## 2022-09-28 PROCEDURE — 2709999900 HC NON-CHARGEABLE SUPPLY

## 2022-09-28 PROCEDURE — 97166 OT EVAL MOD COMPLEX 45 MIN: CPT

## 2022-09-28 PROCEDURE — 97161 PT EVAL LOW COMPLEX 20 MIN: CPT

## 2022-09-28 PROCEDURE — 74011250637 HC RX REV CODE- 250/637: Performed by: HOSPITALIST

## 2022-09-28 PROCEDURE — 81001 URINALYSIS AUTO W/SCOPE: CPT

## 2022-09-28 PROCEDURE — 87086 URINE CULTURE/COLONY COUNT: CPT

## 2022-09-28 PROCEDURE — 82962 GLUCOSE BLOOD TEST: CPT

## 2022-09-28 PROCEDURE — 80048 BASIC METABOLIC PNL TOTAL CA: CPT

## 2022-09-28 PROCEDURE — 82570 ASSAY OF URINE CREATININE: CPT

## 2022-09-28 PROCEDURE — 97530 THERAPEUTIC ACTIVITIES: CPT

## 2022-09-28 PROCEDURE — 74011000250 HC RX REV CODE- 250: Performed by: INTERNAL MEDICINE

## 2022-09-28 PROCEDURE — 84156 ASSAY OF PROTEIN URINE: CPT

## 2022-09-28 PROCEDURE — 74011636637 HC RX REV CODE- 636/637: Performed by: INTERNAL MEDICINE

## 2022-09-28 PROCEDURE — 93306 TTE W/DOPPLER COMPLETE: CPT

## 2022-09-28 PROCEDURE — 94640 AIRWAY INHALATION TREATMENT: CPT

## 2022-09-28 PROCEDURE — 85025 COMPLETE CBC W/AUTO DIFF WBC: CPT

## 2022-09-28 RX ORDER — INSULIN GLARGINE 100 [IU]/ML
15 INJECTION, SOLUTION SUBCUTANEOUS EVERY 12 HOURS
Status: DISCONTINUED | OUTPATIENT
Start: 2022-09-28 | End: 2022-09-29

## 2022-09-28 RX ORDER — FUROSEMIDE 10 MG/ML
40 INJECTION INTRAMUSCULAR; INTRAVENOUS 2 TIMES DAILY
Status: DISCONTINUED | OUTPATIENT
Start: 2022-09-28 | End: 2022-09-29

## 2022-09-28 RX ORDER — GUAIFENESIN 600 MG/1
600 TABLET, EXTENDED RELEASE ORAL EVERY 12 HOURS
Status: DISCONTINUED | OUTPATIENT
Start: 2022-09-28 | End: 2022-09-29

## 2022-09-28 RX ADMIN — ISOSORBIDE DINITRATE 20 MG: 10 TABLET ORAL at 22:29

## 2022-09-28 RX ADMIN — BUDESONIDE 500 MCG: 0.5 SUSPENSION RESPIRATORY (INHALATION) at 21:07

## 2022-09-28 RX ADMIN — HYDRALAZINE HYDROCHLORIDE 50 MG: 50 TABLET, FILM COATED ORAL at 08:38

## 2022-09-28 RX ADMIN — ATENOLOL 25 MG: 25 TABLET ORAL at 08:38

## 2022-09-28 RX ADMIN — HYDRALAZINE HYDROCHLORIDE 50 MG: 50 TABLET, FILM COATED ORAL at 15:33

## 2022-09-28 RX ADMIN — SODIUM CHLORIDE, PRESERVATIVE FREE 10 ML: 5 INJECTION INTRAVENOUS at 14:00

## 2022-09-28 RX ADMIN — HEPARIN SODIUM 12 UNITS/KG/HR: 10000 INJECTION, SOLUTION INTRAVENOUS at 14:39

## 2022-09-28 RX ADMIN — PANTOPRAZOLE SODIUM 40 MG: 40 TABLET, DELAYED RELEASE ORAL at 08:38

## 2022-09-28 RX ADMIN — Medication 15 UNITS: at 08:38

## 2022-09-28 RX ADMIN — ROSUVASTATIN CALCIUM 10 MG: 10 TABLET, FILM COATED ORAL at 22:29

## 2022-09-28 RX ADMIN — Medication 15 UNITS: at 22:35

## 2022-09-28 RX ADMIN — IPRATROPIUM BROMIDE AND ALBUTEROL SULFATE 3 ML: .5; 3 SOLUTION RESPIRATORY (INHALATION) at 08:02

## 2022-09-28 RX ADMIN — BUDESONIDE 500 MCG: 0.5 SUSPENSION RESPIRATORY (INHALATION) at 08:02

## 2022-09-28 RX ADMIN — GUAIFENESIN 600 MG: 600 TABLET, EXTENDED RELEASE ORAL at 22:29

## 2022-09-28 RX ADMIN — IPRATROPIUM BROMIDE AND ALBUTEROL SULFATE 3 ML: .5; 3 SOLUTION RESPIRATORY (INHALATION) at 13:23

## 2022-09-28 RX ADMIN — Medication 2 UNITS: at 12:22

## 2022-09-28 RX ADMIN — FUROSEMIDE 40 MG: 10 INJECTION, SOLUTION INTRAMUSCULAR; INTRAVENOUS at 15:33

## 2022-09-28 RX ADMIN — ARFORMOTEROL TARTRATE 15 MCG: 15 SOLUTION RESPIRATORY (INHALATION) at 21:05

## 2022-09-28 RX ADMIN — HYDRALAZINE HYDROCHLORIDE 50 MG: 50 TABLET, FILM COATED ORAL at 22:29

## 2022-09-28 RX ADMIN — ISOSORBIDE DINITRATE 20 MG: 10 TABLET ORAL at 15:34

## 2022-09-28 RX ADMIN — IPRATROPIUM BROMIDE AND ALBUTEROL SULFATE 3 ML: .5; 3 SOLUTION RESPIRATORY (INHALATION) at 21:08

## 2022-09-28 RX ADMIN — ISOSORBIDE DINITRATE 20 MG: 10 TABLET ORAL at 08:38

## 2022-09-28 RX ADMIN — ARFORMOTEROL TARTRATE 15 MCG: 15 SOLUTION RESPIRATORY (INHALATION) at 08:02

## 2022-09-28 NOTE — PROGRESS NOTES
Wound Prevention Checklist    Patient: Salo Hernandez (77 y.o. female)  Date: 9/28/2022  Diagnosis: Acute CHF (congestive heart failure) ([facility]) [I50.9]  REMY (acute kidney injury) ([facility] [N17.9] <principal problem not specified>    Precautions:         []  Heel prevention boots placed on patient    []  Patient turned q2h during shift    []  Lift team ordered    []  Patient on [place] bed/Specialty bed    []  Each Wound is documented during shift (Stage, Color, drainage, odor, measurements, and dressings)    [x]  Dual skin check done with Pablo Tatum RN

## 2022-09-28 NOTE — PROGRESS NOTES
conducted an initial consultation and Spiritual Assessment for Ary Clements, who is a 80 y.o.,female. Patients Primary Language is: [place]. According to the patients EMR Judaism Affiliation is: Manjula Ramirezej.     The reason the Patient came to the hospital is:   Patient Active Problem List    Diagnosis Date Noted    Acute CHF (congestive heart failure) ([facility] 09/27/2022    REMY (acute kidney injury) ([facility] 09/27/2022    Lower GI bleed 06/02/2020    Severe obesity ([facility] 10/16/2019        The  provided the following Interventions:  Initiated a relationship of care and support. Explored issues of collin, belief, spirituality and Yazdanism/ritual needs while hospitalized. Listened empathically. Provided chaplaincy education. Provided information about Spiritual Care Services. Offered prayer and assurance of continued prayers on patient's behalf. Chart reviewed. The following outcomes where achieved:  Patient shared limited information about both her medical narrative and spiritual journey/beliefs.  confirmed Patient's Manjula Stock Judaism Affiliation. Patient processed feeling about current hospitalization. Patient expressed gratitude for 's visit. Advance Medical Directive was introduced to patient. Assessment:  Patient refused further ACP discussion. She states, \"I don't think I'm ready to do that. I don't think I need that right now. \"  Patient does not have any Yazdanism/cultural needs that will affect patients preferences in health care. There are no spiritual or Yazdanism issues which require intervention at this time. Plan:  Chaplains will continue to follow and will provide pastoral care on an as needed/requested basis.  recommends bedside caregivers page  on duty if patient shows signs of acute spiritual or emotional distress.     Raj Shabazz [phone]

## 2022-09-28 NOTE — PROGRESS NOTES
Problem: Tissue Perfusion - Cardiopulmonary, Altered  Goal: *Optimize tissue perfusion  Outcome: Progressing Towards Goal  Goal: *Absence of hypoxia  Outcome: Progressing Towards Goal     Problem: Patient Education: Go to Patient Education Activity  Goal: Patient/Family Education  Outcome: Progressing Towards Goal

## 2022-09-28 NOTE — PROGRESS NOTES
Problem: Self Care Deficits Care Plan (Adult)  Goal: *Acute Goals and Plan of Care (Insert Text)  Outcome: Resolved/Met   OCCUPATIONAL THERAPY EVALUATION/DISCHARGE    Patient: Jessa Villa (77 y.o. female)  Date: 9/28/2022  Primary Diagnosis: Acute CHF (congestive heart failure) ([facility] [I50.9]  REMY (acute kidney injury) ([facility] [N17.9]       Precautions:  Fall  PLOF: Mod I with ADLs and functional mobility w/o AD    ASSESSMENT AND RECOMMENDATIONS:  Nursing/RN cleared for pt to participate in OT evaluation and tx session. Patient presents lying supine in bed, A & O x 4, no c/o pain. Bed mobility: Mod I supine <-> sit edge of bed. LB dress: Mod I doff and don slipper sock seated edge of bed w/ Good sitting balance using crossing leg method. Toilet transfer: Mod I w/o AD, good balance, Mod I toilet tasks and washing hands in stance at sink. Patient resting semi-reclined in bed in end of tx session, Patient verbalized understanding to utilize call bell to request assist as needed. Nursing present to observe pt's level of assist with toilet transfer. Patient presents at baseline as PLOF with ADLs and functional mobility. Patient verbalized understanding of skilled OT services not indicated at this time while in acute hospital.       Further Equipment Recommendations for Discharge: patient has all recommended DME    AMPAC: At this time and based on an AM-PAC score of 24/24, no further OT is recommended upon discharge due to (i.e. patient at baseline functional statusetc). Recommend patient returns to prior setting with prior services. This AMPAC score should be considered in conjunction with interdisciplinary team recommendations to determine the most appropriate discharge setting. Patient's social support, diagnosis, medical stability, and prior level of function should also be taken into consideration. SUBJECTIVE:   Patient stated I do need to use the bathroom.     OBJECTIVE DATA SUMMARY:     Past Medical History:   Diagnosis Date    Asthma     CAD (coronary artery disease)     Diabetes ([facility]     Headache     HTN (hypertension)     Hypercholesteremia     Leg cramps     on potassium lasix    Pulmonary emboli (HCC)     UTI (urinary tract infection)      Past Surgical History:   Procedure Laterality Date    COLONOSCOPY N/A 6/5/2020    COLONOSCOPY WITH BXS performed by Janine Nichols MD at [address] [facility]      HX [address]  04/22/2014    HX HYSTERECTOMY  1992    HX TUBAL LIGATION      laparoscopic    IR [facility] IVC FILTER  6/10/2020     Barriers to Learning/Limitations: None  Compensate with: visual, verbal, tactile, kinesthetic cues/model    Home Situation:   Home Situation  Home Environment: Private residence  # Steps to Enter: 4  Rails to Enter: Yes  Hand Rails : Left  One/Two Story Residence: One story  Living Alone: No  Support Systems: Other Family Member(s), Child(gunnar)  Patient Expects to be Discharged to[de-identified] Home  Current DME Used/Available at Home: danielle Tellez Joen [place], evie, Shower chair, Grab bars  Tub or Shower Type: Shower  []     Right hand dominant   []     Left hand dominant    Cognitive/Behavioral Status:  Neurologic State: Alert  Orientation Level: Oriented X4  Cognition: Follows commands  Safety/Judgement: Fall prevention    Skin: appears intact  Edema: BLEs    Vision/Perceptual:  glasses, appears intact, able to tell correct time on wall clock       Corrective Lenses: Glasses    Coordination: BUE  Coordination: Within functional limits  Fine Motor Skills-Upper: Left Intact; Right Intact    Gross Motor Skills-Upper: Left Intact; Right Intact    Balance:  Sitting: Intact  Standing: Without support  Standing - Static: Good  Standing - Dynamic : Good    Strength: BUE  Strength:  Within functional limits  Tone & Sensation: BUE  Tone: Normal     Range of Motion: BUE  AROM: Within functional limits     Functional Mobility and Transfers for ADLs:  Bed Mobility:  Rolling: Modified independent  Supine to Sit: Modified independent  Sit to Supine: Modified independent     Transfers:  Sit to Stand: Modified independent  Stand to Sit: Modified independent      Toilet Transfer : Modified independent      Bathroom Mobility: Modified independent    ADL Assessment:  Feeding: Modified independent    Oral Facial Hygiene/Grooming: Modified Independent    Bathing: Modified independent    Upper Body Dressing: Modified independent    Lower Body Dressing: Modified independent    Toileting: Modified independent    ADL Intervention:       Grooming  Position Performed: Standing  Washing Hands: Modified independent  Lower Body Dressing Assistance  Socks: Modified independent  Leg Crossed Method Used: Yes  Position Performed: Seated edge of bed    Toileting  Bladder Hygiene: Modified independent    Cognitive Retraining  Safety/Judgement: Fall prevention    Pain:  Pain level pre-treatment: 0/10   Pain level post-treatment: 0/10   Activity Tolerance:   Good  Please refer to the flowsheet for vital signs taken during this treatment. After treatment:   []  Patient left in no apparent distress sitting up in chair  [x]  Patient left in no apparent distress in bed  [x]  Call bell left within reach  [x]  Nursing notified  []  Caregiver present  []  Bed alarm activated    COMMUNICATION/EDUCATION:   [x]      Role of Occupational Therapy in the acute care setting  [x]      Home safety education was provided and the patient/caregiver indicated understanding. [x]      Patient/family have participated as able and agree with findings and recommendations. []      Patient is unable to participate in plan of care at this time. Thank you for this referral.  Sofiya Subramanian  Time Calculation: 10 mins      Eval Complexity: History: MEDIUM Complexity : Expanded review of history including physical, cognitive and psychosocial  history ;    Examination: MEDIUM Complexity : 3-5 performance deficits relating to physical, cognitive , or psychosocial skils that result in activity limitations and / or participation restrictions; Decision Making:MEDIUM Complexity : Patient may present with comorbidities that affect occupational performnce. Miniml to moderate modification of tasks or assistance (eg, physical or verbal ) with assesment(s) is necessary to enable patient to complete evaluation     [facility] [place]® Daily Activity Inpatient Short Form (6-Clicks)*    How much HELP from another person does the patient currently need    (If the patient hasn't done an activity recently, how much help from another person do you think he/she would need if he/she tried?)   Total (Total A or Dep)   A Lot  (Mod to Max A)   A Little (Sup or Min A)   None (Mod I to I)   Putting on and taking off regular lower body clothing? [] 1 [] 2 [] 3 [x] 4   2. Bathing (including washing, rinsing,      drying)? [] 1 [] 2 [] 3 [x] 4   3. Toileting, which includes using toilet, bedpan or urinal?   [] 1 [] 2 [] 3 [x] 4   4. Putting on and taking off regular upper body clothing? [] 1 [] 2 [] 3 [x] 4   5. Taking care of personal grooming such as brushing teeth? [] 1 [] 2 [] 3 [x] 4   6. Eating meals?    [] 1 [] 2 [] 3 [x] 4

## 2022-09-28 NOTE — CONSULTS
Consult noted for REMY on CKD from cardiorenal syndrome in setting of AC on chronic diastolic CHF   Baseline creat ~ 2.5 range in June 2022  Presented to [facility] with creat in 4 range   CHF and Duplex of her legs showed nonoccluding DVT within the left popliteal vein. Apparently patient had missed taking her lasix for 3 week prior to presentation   Patient did get diuresed and her creat improved to 2.6 --> at baseline now   Still has significant exertional dyspnea  ECHO noted preserved EF , diastolic dysfunction but no pulm HTN   Chest x-ray with pulmonary vascular congestion. At [facility]   Ordering repeat CXR , holding lasix as rec by cardiology --> agree   Not quite sure cause of patients hypoxia, and  exertional dyspnea ?  COPD v/s PE , but has been on [facility]  Her renal functions improved from 4.1--> 2.6 without much intervention   Patient has not been on diuretics for last 3 + weeks so her worsening creatinine was  not d/t diuresis   Renal functions now  at baseline ,  Checking  BNP , CXR   Fluid restrict 1200ml/day   Check renal ultrasound   Further recs based on the above recheck   Follow renal panel in AM    Consult note to flow     Please call with questions    Carlotta Whitney MD FASN  Cell [phone]  Pager: [phone]

## 2022-09-28 NOTE — DIABETES MGMT
Diabetes/ Glycemic Control Plan of Care  Recommendations:   Pt with his T2DM  Noted basal and corrective insulin coverage ordered for pt. Blood glucose currently controlled  Will continue inpatient monitoring. Assessment:   DX: No diagnosis found. Fasting/ Morning blood glucose:   Lab Results   Component Value Date/Time    Glucose 132 (H) 09/28/2022 04:34 AM    Glucose (POC) 120 (H) 09/28/2022 08:03 AM    Glucose (POC) 149 (H) 06/16/2020 11:41 AM     IV Fluids containing dextrose: none  Steroids:  none    Blood glucose values:        Latest Reference Range & Units 9/27/22 21:53 9/28/22 08:03   GLUCOSE,FAST - POC 70 - 110 mg/dL 133 (H) 120 (H)   (H): Data is abnormally high  Within target range (70-180mg/dL):  progressing. Current insulin orders:   Lantus 15 units every 12 hours. Lispro corrective insulin coverage AC&HS  Total Daily Dose previous 24 hours = 15 units   Current A1c:   Lab Results   Component Value Date/Time    Hemoglobin A1c 6.1 (H) 09/27/2022 07:08 PM      Equivalent to an average Blood Glucose of 128 mg/dl for 2-3 months prior to admission  Adequate glycemic control PTA: yes  Nutrition/Diet:   Active Orders   Diet    ADULT DIET Regular; 3 carb choices (45 gm/meal); Low Fat/Low Chol/High Fiber/JACQUE; No Salt Added (3-4 gm); Low Potassium (Less than 3000 mg/day); Low Phosphorus (Less than 1000 mg)      Meal Intake:  Patient Vitals for the past 168 hrs:   % Diet Eaten   09/28/22 0838 51 - 75%     Supplement Intake:  Patient Vitals for the past 168 hrs:   Supplement intake %   09/28/22 0838 0%     Home diabetes medications:   Key Antihyperglycemic Medications               insulin glargine (LANTUS) 100 unit/mL injection (Taking) by SubCUTAneous route nightly. 26 units in AM, 15 units in PM        Plan/Goals:   Blood glucose will be within target of 70 - 180 mg/dl within 72 hours  Reinforce dietary and medication compliance at home.           Education:  [] Refer to Diabetes Education Record [x] Education not indicated at this time     Nazanin Herman, [address] [facility]  Ext [phone]

## 2022-09-28 NOTE — PROGRESS NOTES
Problem: Mobility Impaired (Adult and Pediatric)  Goal: *Acute Goals and Plan of Care (Insert Text)  Description: Physical Therapy Goals  Initiated 9/28/2022 and to be accomplished within 7 day(s)  1. Patient will move from supine to sit and sit to supine  in bed with independence. 2.  Patient will transfer from bed to chair and chair to bed with independence. 3.  Patient will perform sit to stand with independence. 4.  Patient will ambulate with independence for 200 feet with the least restrictive device. 5.  Patient will ascend/descend 3 stairs with 1 handrail(s) with supervision/set-up. PLOF: independent with mobility without an assistive device, several steps to enter home, sons live on either side of her and are able to assist as needed     Outcome: Progressing Towards Goal   PHYSICAL THERAPY EVALUATION    Patient: Shayy Licona (77 y.o. female)  Date: 9/28/2022  Primary Diagnosis: Acute CHF (congestive heart failure) ([facility] [address] [I50.9]  REMY (acute kidney injury) ([facility] [address] [N17.9]       Precautions: fall, O2       PLOF: see above    ASSESSMENT :  Based on the objective data described below, the patient presents with decreased dynamic standing balance and activity tolerance resulting in decreased independence with functional mobility. Pt is mod I with bed mobility and supervision with standing activity. Patient was on 2L of O2 during evaluation and O2 sat was 92% after activity. Pt was left sitting up in recliner with needs in reach and nursing notified. Patient will benefit from skilled intervention to address the above impairments.   Patient's rehabilitation potential is considered to be Good  Factors which may influence rehabilitation potential include:   []         None noted  []         Mental ability/status  [x]         Medical condition  []         Home/family situation and support systems  []         Safety awareness  []         Pain tolerance/management  []         Other:      PLAN :  Recommendations and Planned Interventions:   [x]           Bed Mobility Training             []    Neuromuscular Re-Education  [x]           Transfer Training                   []    Orthotic/Prosthetic Training  [x]           Gait Training                          []    Modalities  [x]           Therapeutic Exercises           []    Edema Management/Control  [x]           Therapeutic Activities            []    Family Training/Education  [x]           Patient Education  []           Other (comment):    Frequency/Duration: Patient will be followed by physical therapy 3-5 times a week to address goals. Further Equipment Recommendations for Discharge: N/A    AMPAC: Based on an AM-PAC score of 20/24 and their current functional mobility deficits, it is recommended that the patient have 2-3 sessions per week of Physical Therapy at d/c to increase the patient's independence. This AMPAC score should be considered in conjunction with interdisciplinary team recommendations to determine the most appropriate discharge setting. Patient's social support, diagnosis, medical stability, and prior level of function should also be taken into consideration. SUBJECTIVE:   Patient stated I'm starting to feel better but I get tired quickly.     OBJECTIVE DATA SUMMARY:     Past Medical History:   Diagnosis Date    Asthma     CAD (coronary artery disease)     Diabetes ([facility] [address]     Headache     HTN (hypertension)     Hypercholesteremia     Leg cramps     on potassium lasix    Pulmonary emboli (HCC)     UTI (urinary tract infection)      Past Surgical History:   Procedure Laterality Date    COLONOSCOPY N/A 6/5/2020    COLONOSCOPY WITH BXS performed by Andrea Flores MD at [facility] [address]  04/22/2014    HX HYSTERECTOMY  1992    HX TUBAL LIGATION      laparoscopic    IR [facility] IVC FILTER  6/10/2020     Barriers to Learning/Limitations: None  Compensate with: N/A  Home Situation:  Home Situation  Home Environment: Private residence  # Steps to Enter: 0  One/Two Story Residence: One story  Living Alone: No  Support Systems: Other Family Member(s)  Patient Expects to be Discharged to[de-identified] Home  Current DME Used/Available at Home: None  Critical Behavior:   Calm and cooperative      Strength:    Strength: Within functional limits      Tone & Sensation:   Tone: Normal       Range Of Motion:  AROM: Within functional limits      Functional Mobility:  Bed Mobility:  Rolling: Modified independent  Supine to Sit: Modified independent  Sit to Supine: Modified independent     Transfers:  Sit to Stand: Supervision  Stand to Sit: Supervision     Balance:   Sitting: Intact  Standing: Without support  Standing - Static: Good  Standing - Dynamic : Fair  Ambulation/Gait Training:  Distance (ft): 12 Feet (ft) (twice)  Assistive Device:  (none)  Ambulation - Level of Assistance: Stand-by assistance  Gait Abnormalities: Decreased step clearance  Speed/Madelyn: Slow  Therapeutic Exercises: Ankle pumps, heel slides  Pain:  Pain level pre-treatment: 0/10   Pain level post-treatment: 0/10   Pain Intervention(s) : n/a    Activity Tolerance:   Fair-  Please refer to the flowsheet for vital signs taken during this treatment. After treatment:   [x]         Patient left in no apparent distress sitting up in chair  []         Patient left in no apparent distress in bed  [x]         Call bell left within reach  [x]         Nursing notified  []         Caregiver present  []         Bed alarm activated  []         SCDs applied    COMMUNICATION/EDUCATION:   [x]         Role of Physical Therapy in the acute care setting. [x]         Fall prevention education was provided and the patient/caregiver indicated understanding. [x]         Patient/family have participated as able in goal setting and plan of care. [x]         Patient/family agree to work toward stated goals and plan of care.   []         Patient understands intent and goals of therapy, but is neutral about his/her participation. []         Patient is unable to participate in goal setting/plan of care: ongoing with therapy staff.  []         Other: Thank you for this referral.  Ron Carlos, PT   Time Calculation: 23 mins      Eval Complexity: History: HIGH Complexity :3+ comorbidities / personal factors will impact the outcome/ POC Exam:MEDIUM Complexity : 3 Standardized tests and measures addressing body structure, function, activity limitation and / or participation in recreation  Presentation: MEDIUM Complexity : Evolving with changing characteristics  Clinical Decision Making:Medium Complexity    Overall Complexity:MEDIUM    Tristan Bill AM-PAC® Basic Mobility Inpatient Short Form (6-Clicks) Version 2    How much HELP from another person does the patient currently need    (If the patient hasn't done an activity recently, how much help from another person do you think he/she would need if he/she tried?)   Total (Total A or Dep)   A Lot  (Mod to Max A)   A Little (Sup or Min A)   None (Mod I to I)   Turning from your back to your side while in a flat bed without using bedrails? [] 1 [] 2 [] 3 [x] 4   2. Moving from lying on your back to sitting on the side of a flat bed without using bedrails? [] 1 [] 2 [] 3 [x] 4   3. Moving to and from a bed to a chair (including a wheelchair)? [] 1 [] 2 [x] 3 [] 4   4. Standing up from a chair using your arms (e.g., wheelchair, or bedside chair)? [] 1 [] 2 [x] 3 [] 4   5. Walking in hospital room? [] 1 [] 2 [x] 3 [] 4   6. Climbing 3-5 steps with a railing?+   [] 1 [] 2 [x] 3 [] 4   +If stair climbing cannot be assessed, skip item #6. Sum responses from items 1-5. Based on an AM-PAC score of 20/24 and their current functional mobility deficits, it is recommended that the patient have 2-3 sessions per week of Physical Therapy at d/c to increase the patient's independence.

## 2022-09-28 NOTE — CONSULTS
Cardiology Initial Patient Referral Note    Cardiology referral request from Dr. Leigh Drake for evaluation and management/treatment of CHF    Date of  Admission: 9/27/2022  6:22 PM   Primary Care Physician:  Dasha Liu MD    Attending Cardiologist: Dr. Hemanth Ware    Patient seen and independently examined. Chart extensively reviewed. Patient with a history of a false positive nuclear stress test in June [facility] in [facility]. As result, she underwent a cardiac catheterization which showed normal coronary anatomy, normal LV function, and upper normal LV filling pressures with an LVEDP of 18 mmHg. She also has a history of what appears to be chronic DVTs and pulmonary emboli status post IVC filter, maintained on Eliquis for oral anticoagulation. Previously she states she was taking warfarin. She lives in the [facility] and presented to the hospital there complaining of worsening shortness of breath, was found to be in hypoxic respiratory failure with resting O2 sats at 80%. Chest x-ray reportedly with increased pulmonary vascular congestion, however on exam, she does not appear to be volume overloaded to me. She has no increased JVP and no significant leg swelling. Her breath sounds to have scattered rhonchi. Echocardiogram done today shows preserved LV function, EF 60 to 65%, no significant pulm hypertension and a normal sized IVC. I do not feel that she has decompensated heart failure. Her shortness of breath is likely noncardiac in nature. Her renal function is improving. At this point I would continue to hold her diuretic therapy and hold her ACE inhibitor until her renal function approaches baseline. No further testing planned from a cardiac standpoint. She can resume her Eliquis and stop the IV heparin from a cardiac standpoint. We will be available if additional questions arise. Beatris Soto MD     Assessment:     -Shortness of breath.   Noncardiac in etiology suspected  -Acute renal failure. Patient with chronic kidney disease with worsening creatinine this admission  Lab data reveal REMY (Cr 4.13 with K 5.3, prior Cr 2.83 on 8/26/2022) and normal BNP at 323 and normal troponin < 0.02.  -Nuclear stress test 6/6/2022 with moderate to large area of reversible ischemia extending from the septal wall across the anterior apical wall to the lateral wall  Cardiac catheterization 6/8/2022 with normal epicardial coronary arteries, normal LVEF 60% with normal wall motion  -L popliteal nonocclusive DVT by duplex US 9/26/2022. 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  -Hx syncope with negative tilt table 06/2022  -HTN  -DMII, on insulin  -HLD  -Hx recurrent UTI's  -COPD/asthma  -Colitis  -Remote Hx tobacco use    No Primary cardiologist, consult done by Dr. Nedra Whalen:     -Will check Echocardiogram for completeness.  -Nephrology consultation pending, appreciate assistance in advance. Defer volume management to nephrology team.  -Continue outpatient Atenolol, Hydralazine, Isordil.  -Hold outpatient Lisinopril in setting of REMY. -Continue Crestor.  -Continued on Heparin infusion given Hx PE/DVT per primary team, on Eliquis as outpatient. History of Present Illness: This is a 80 y.o. female admitted for Acute CHF (congestive heart failure) ([facility] [address] [I50.9]  REMY (acute kidney injury) ([facility] [address] [N17.9]. Patient complains of: shortness of breath      Bhupendra Viraj is a 80 y.o. female who initially presented to [facility] urgent care and subsequently sent to [facility] ER due to shortness of breath/hypoxia (initial O2 sats 83-35% RA) with improvement of O2 sats to 97% on 3LNC. She reports progressively worsening shortness of breath over the past 3 weeks with a few episodes of PND/orthopnea. She admits that she had not been taking her Lasix for 3-4 weeks, as it was in the back of the cabinet.   She had been taking her Lasix prior with expected diuretic effect and had noticed relatively decreased urination over the last few weeks. Pt was found to have REMY at [address] [place] [address] ER and was transferred to [facility] due to not having nephrology on call. Cardiac risk factors: dyslipidemia, diabetes mellitus, hypertension, post-menopausal      Review of Symptoms:  Except as stated above include:  Constitutional:  negative  Respiratory:  negative  Cardiovascular:  As per HPI  Gastrointestinal: negative  Genitourinary:  negative  Musculoskeletal:  Negative  Neurological:  Negative  Dermatological:  Negative  Endocrinological: Negative  Psychological:  Negative       Past Medical History:     Past Medical History:   Diagnosis Date    Asthma     CAD (coronary artery disease)     Diabetes ([address]     Headache     HTN (hypertension)     Hypercholesteremia     Leg cramps     on potassium lasix    Pulmonary emboli (HCC)     UTI (urinary tract infection)          Social History:     Social History     Socioeconomic History    Marital status:    Tobacco Use    Smoking status: Former    Smokeless tobacco: Never   Substance and Sexual Activity    Alcohol use: Never    Sexual activity: Never        Family History:   No family history on file.      Medications:   No Known Allergies     Current Facility-Administered Medications   Medication Dose Route Frequency    sodium chloride (NS) flush 5-40 mL  5-40 mL IntraVENous Q8H    sodium chloride (NS) flush 5-40 mL  5-40 mL IntraVENous PRN    acetaminophen (TYLENOL) tablet 650 mg  650 mg Oral Q6H PRN    Or    acetaminophen (TYLENOL) suppository 650 mg  650 mg Rectal Q6H PRN    polyethylene glycol (MIRALAX) packet 17 g  17 g Oral DAILY PRN    promethazine (PHENERGAN) tablet 12.5 mg  12.5 mg Oral Q6H PRN    Or    ondansetron (ZOFRAN) injection 4 mg  4 mg IntraVENous Q6H PRN    insulin lispro (HUMALOG) injection   SubCUTAneous AC&HS    glucose chewable tablet 16 g  4 Tablet Oral PRN    glucagon (GLUCAGEN) injection 1 mg  1 mg IntraMUSCular PRN    budesonide (PULMICORT) 500 mcg/2 ml nebulizer suspension  500 mcg Nebulization BID RT    And    arformoteroL (BROVANA) neb solution 15 mcg  15 mcg Nebulization BID RT    pantoprazole (PROTONIX) tablet 40 mg  40 mg Oral ACB    albuterol-ipratropium (DUO-NEB) 2.5 MG-0.5 MG/3 ML  3 mL Nebulization Q6HWA RT    atenoloL (TENORMIN) tablet 25 mg  25 mg Oral DAILY    hydrALAZINE (APRESOLINE) tablet 50 mg  50 mg Oral TID    isosorbide dinitrate (ISORDIL) tablet 20 mg  20 mg Oral TID    rosuvastatin (CRESTOR) tablet 10 mg  10 mg Oral QHS    insulin glargine (LANTUS) injection 15 Units  15 Units SubCUTAneous BID    heparin (porcine) 25,000 units in 0.45% saline 250 ml infusion  18-36 Units/kg/hr IntraVENous TITRATE         Physical Exam:   Visit Vitals  BP (!) 104/53 (BP 1 Location: Left lower arm, BP Patient Position: At rest)   Pulse 75   Temp 97.4 °F (36.3 °C)   Resp 16   Ht 5' 3\" (1.6 m)   Wt 79.3 kg (174 lb 13.2 oz)   SpO2 97%   BMI 30.97 kg/m²         BP Readings from Last 3 Encounters:   09/28/22 (!) 104/53   06/16/20 144/62   12/11/19 130/80     Pulse Readings from Last 3 Encounters:   09/28/22 75   06/16/20 90   10/16/19 76     Wt Readings from Last 3 Encounters:   09/27/22 79.3 kg (174 lb 13.2 oz)   06/12/20 85.5 kg (188 lb 7.9 oz)   12/11/19 84.4 kg (186 lb)       General:  alert, cooperative, no distress, appears stated age  Neck:  supple  Lungs:  decreased  Heart:  regular rate and rhythm  Abdomen:  abdomen is soft without significant tenderness, masses, organomegaly or guarding  Extremities:  atraumatic, no edema  Skin: Warm and dry.   Neuro: alert, oriented x3, affect appropriate, no focal neurological deficits, moves all extremities well, no involuntary movements  Psych: non focal     Data Review:     Recent Labs     09/28/22  0434 09/27/22  1908   WBC 6.2 7.4   HGB 11.3* 13.1   HCT 35.5 41.8    247     Recent Labs     09/28/22  0434 09/27/22  1908    134*   K 5.0 5.1    104   CO2 26 25   * 152*   BUN 68* 70* CREA 2.69* 2.98*   CA 8.9 9.2   MG 2.1 2.2             Signed By: Camryn Deal PA-C     September 28, 2022

## 2022-09-28 NOTE — PROGRESS NOTES
Problem: Falls - Risk of  Goal: *Absence of Falls  Description: Document Negrito Counts Fall Risk and appropriate interventions in the flowsheet.   Outcome: Progressing Towards Goal  Note: Fall Risk Interventions:  Mobility Interventions: Bed/chair exit alarm, Patient to call before getting OOB, Utilize walker, cane, or other assistive device    Mentation Interventions: Bed/chair exit alarm, Toileting rounds, Update white board    Medication Interventions: Bed/chair exit alarm, Patient to call before getting OOB, Teach patient to arise slowly    Elimination Interventions: Bed/chair exit alarm, Call light in reach              Problem: Patient Education: Go to Patient Education Activity  Goal: Patient/Family Education  Outcome: Progressing Towards Goal     Problem: Tissue Perfusion - Cardiopulmonary, Altered  Goal: *Optimize tissue perfusion  Outcome: Progressing Towards Goal  Goal: *Absence of hypoxia  Outcome: Progressing Towards Goal

## 2022-09-28 NOTE — PROGRESS NOTES
09/28/22 0838   Intake (mL)   P.O. 240 mL  (coffee)   % Diet Eaten 51 - 75%   Supplement intake % 0%   Pt tolerated breakfast fine.

## 2022-09-28 NOTE — PROGRESS NOTES
Bedside report completed. Received pt in bed resting quietly. No s/s of any pain of distress. Call bell within reach, bed is in lowest position. Will continue to monitor.

## 2022-09-28 NOTE — PROGRESS NOTES
[facility]  [facility]    Patient: Mariam Manning Age: 80 y.o. : 1940 MR#: [id number] SSN: [id number]  Date/Time: 2022    Subjective:     Patient states she was noncompliant with Lasix for several days. She reports cough productive of clear sputum, she is having difficulty expectorating however. She states dyspnea on exertion came on rather suddenly. Reports compliance with Eliquis, has not missed any doses. She does not have a pulmonologist in the community, has not had PFTs done. She is not on home oxygen. She has chronic lower extremity edema. She quit tobacco 30 years ago, estimates about a 10-pack-year history. Denies wheezing. Denies occupational exposure. Denies significant secondhand smoke exposure as a child. Assessment/Plan:     1. Dyspnea on exertion. Suspect COPD. Solu-Medrol, bronchodilators, Mucinex, bronchial hygiene protocol. Pulmonology consult. 2. Cardiac catheterization 2022 with normal epicardial coronary arteries, normal LVEF 60% with normal wall motion. Nuclear stress test 2022 with moderate to large area of reversible ischemia extending from the septal wall across the anterior apical wall to the lateral wall. 3. L popliteal nonocclusive DVT by duplex US 2022. 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 4.  REMY on CKD 3. Nephrology consult. Follow renal ultrasound. 5.  Acute respiratory failure with hypoxia, resting O2 sat 80%  6. DM2. Lantus, sliding scale insulin, ADA diet. Monitor steroids. 7. dyslipidemia  8. Hx recurrent UTI's  9. Former tobacco.  10. Obesity Body mass index is 30.82 kg/m². 11. Hx of ischemic colitis . 12.  hx syncope with negative tilt table 2022  13. DVT prophylaxis  14. Full code. PT OT. Assess oxygen needs at discharge. Discussed on IDR. I discussed the case w/ Dr. Yuniel Fox.      Additional Notes:      Case discussed with:  [x]Patient []Family  [x]Nursing  [x]Case Management  DVT Prophylaxis:  []Lovenox  []Hep SQ  []SCDs  []Coumadin   []On Heparin gtt  [x] noac    Objective:   VS: Visit Vitals  BP (!) 144/71   Pulse 64   Temp 97.8 °F (36.6 °C)   Resp 18   Ht 5' 3\" (1.6 m)   Wt 78.9 kg (174 lb)   SpO2 98%   BMI 30.82 kg/m²      Tmax/24hrs: Temp (24hrs), Av.8 °F (36.6 °C), Min:97.4 °F (36.3 °C), Max:98.7 °F (37.1 °C)    Input/Output:   Intake/Output Summary (Last 24 hours) at 2022 1030  Last data filed at 2022 0838  Gross per 24 hour   Intake 600 ml   Output 300 ml   Net 300 ml       General: Awake alert and oriented x4. Appears several years younger than her stated age. Speaking in full sentences on supplemental oxygen. HEENT normocephalic atraumatic pupils equally round reactive to light. Oropharynx clear  Cardiovascular: Regular rate and rhythm. Pulmonary: Attenuated breath sounds. No wheeze. Rhonchi at bases.   GI: Soft nontender nondistended no  Extremities: Nonpitting edema bilaterally  Neuro no focal deficit  Additional: No rash to visible skin    Labs:    Recent Results (from the past 24 hour(s))   MAGNESIUM    Collection Time: 22  7:08 PM   Result Value Ref Range    Magnesium 2.2 1.6 - 2.6 mg/dL   METABOLIC PANEL, BASIC    Collection Time: 22  7:08 PM   Result Value Ref Range    Sodium 134 (L) 136 - 145 mmol/L    Potassium 5.1 3.5 - 5.5 mmol/L    Chloride 104 100 - 111 mmol/L    CO2 25 21 - 32 mmol/L    Anion gap 5 3.0 - 18 mmol/L    Glucose 152 (H) 74 - 99 mg/dL    BUN 70 (H) 7.0 - 18 MG/DL    Creatinine 2.98 (H) 0.6 - 1.3 MG/DL    BUN/Creatinine ratio 23 (H) 12 - 20      GFR est AA 18 (L) >60 ml/min/1.73m2    GFR est non-AA 15 (L) >60 ml/min/1.73m2    Calcium 9.2 8.5 - 10.1 MG/DL   CBC WITH AUTOMATED DIFF    Collection Time: 22  7:08 PM   Result Value Ref Range    WBC 7.4 4.6 - 13.2 K/uL    RBC 4.39 4.20 - 5.30 M/uL    HGB 13.1 12.0 - 16.0 g/dL    HCT 41.8 35.0 - 45.0 %    MCV 95.2 78.0 - 100.0 FL MCH 29.8 24.0 - 34.0 PG    MCHC 31.3 31.0 - 37.0 g/dL    RDW 14.1 11.6 - 14.5 %    PLATELET 389 222 - 986 K/uL    MPV 9.8 9.2 - 11.8 FL    NRBC 0.0 0  WBC    ABSOLUTE NRBC 0.00 0.00 - 0.01 K/uL    NEUTROPHILS 59 40 - 73 %    LYMPHOCYTES 21 21 - 52 %    MONOCYTES 16 (H) 3 - 10 %    EOSINOPHILS 3 0 - 5 %    BASOPHILS 0 0 - 2 %    IMMATURE GRANULOCYTES 0 0.0 - 0.5 %    ABS. NEUTROPHILS 4.4 1.8 - 8.0 K/UL    ABS. LYMPHOCYTES 1.6 0.9 - 3.6 K/UL    ABS. MONOCYTES 1.2 0.05 - 1.2 K/UL    ABS. EOSINOPHILS 0.3 0.0 - 0.4 K/UL    ABS. BASOPHILS 0.0 0.0 - 0.1 K/UL    ABS. IMM.  GRANS. 0.0 0.00 - 0.04 K/UL    DF AUTOMATED     PTT    Collection Time: 09/27/22  7:08 PM   Result Value Ref Range    aPTT 32.4 23.0 - 36.4 SEC   HEMOGLOBIN A1C WITH EAG    Collection Time: 09/27/22  7:08 PM   Result Value Ref Range    Hemoglobin A1c 6.1 (H) 4.2 - 5.6 %    Est. average glucose 128 mg/dL   GLUCOSE, POC    Collection Time: 09/27/22  9:53 PM   Result Value Ref Range    Glucose (POC) 133 (H) 70 - 110 mg/dL   MAGNESIUM    Collection Time: 09/28/22  4:34 AM   Result Value Ref Range    Magnesium 2.1 1.6 - 2.6 mg/dL   METABOLIC PANEL, BASIC    Collection Time: 09/28/22  4:34 AM   Result Value Ref Range    Sodium 137 136 - 145 mmol/L    Potassium 5.0 3.5 - 5.5 mmol/L    Chloride 106 100 - 111 mmol/L    CO2 26 21 - 32 mmol/L    Anion gap 5 3.0 - 18 mmol/L    Glucose 132 (H) 74 - 99 mg/dL    BUN 68 (H) 7.0 - 18 MG/DL    Creatinine 2.69 (H) 0.6 - 1.3 MG/DL    BUN/Creatinine ratio 25 (H) 12 - 20      GFR est AA 21 (L) >60 ml/min/1.73m2    GFR est non-AA 17 (L) >60 ml/min/1.73m2    Calcium 8.9 8.5 - 10.1 MG/DL   CBC WITH AUTOMATED DIFF    Collection Time: 09/28/22  4:34 AM   Result Value Ref Range    WBC 6.2 4.6 - 13.2 K/uL    RBC 3.72 (L) 4.20 - 5.30 M/uL    HGB 11.3 (L) 12.0 - 16.0 g/dL    HCT 35.5 35.0 - 45.0 %    MCV 95.4 78.0 - 100.0 FL    MCH 30.4 24.0 - 34.0 PG    MCHC 31.8 31.0 - 37.0 g/dL    RDW 14.2 11.6 - 14.5 %    PLATELET 643 074 - 420 K/uL    MPV 9.7 9.2 - 11.8 FL    NRBC 0.0 0  WBC    ABSOLUTE NRBC 0.00 0.00 - 0.01 K/uL    NEUTROPHILS 58 40 - 73 %    LYMPHOCYTES 21 21 - 52 %    MONOCYTES 17 (H) 3 - 10 %    EOSINOPHILS 3 0 - 5 %    BASOPHILS 1 0 - 2 %    IMMATURE GRANULOCYTES 1 (H) 0.0 - 0.5 %    ABS. NEUTROPHILS 3.6 1.8 - 8.0 K/UL    ABS. LYMPHOCYTES 1.3 0.9 - 3.6 K/UL    ABS. MONOCYTES 1.1 0.05 - 1.2 K/UL    ABS. EOSINOPHILS 0.2 0.0 - 0.4 K/UL    ABS. BASOPHILS 0.0 0.0 - 0.1 K/UL    ABS. IMM.  GRANS. 0.0 0.00 - 0.04 K/UL    DF AUTOMATED     PTT    Collection Time: 09/28/22  4:34 AM   Result Value Ref Range    aPTT 156.3 (HH) 23.0 - 36.4 SEC   GLUCOSE, POC    Collection Time: 09/28/22  8:03 AM   Result Value Ref Range    Glucose (POC) 120 (H) 70 - 110 mg/dL     Additional Data Reviewed:      Signed By: Daron Kumari MD     September 28, 2022 10:30 AM

## 2022-09-28 NOTE — ACP (ADVANCE CARE PLANNING)
Advance Care Planning   Advance Care Planning Inpatient Note  [facility] Care Department    Today's Date: 9/28/2022  Unit: [address]    Received request from rounding. Upon review of chart and communication with care team, patient's decision making abilities are not in question. Patient was/were present in the room during visit. Goals of ACP Conversation:  Discuss Advance Care planning documents    Health Care Decision Makers:    No healthcare decision makers have been documented. Click here to complete [address] including selection of the Healthcare Decision Maker Relationship (ie \"Primary\")  Summary:  No Decision Maker named by patient at this time  Patient stated, \"I don't think I 'm ready to do one. I don't think I need that right now. \"  Advance Care Planning Documents (Patient Wishes) on file:  None     Assessment:    Patient is not able to process the need for an Advance Medical Directive. Patient when offered assistance to complete an AMD stated, \" I'm sorry but I don't think I'm ready  to do that. I don't think I need that right now. \"    Interventions:  Deferred conversation as patient not interested in completing an advance directive at this time    Care Preferences Communicated:  No    Outcomes/Plan:  Completion of AMD refused     ARVIND Weinstein on 9/28/2022 at 4:49 PM

## 2022-09-28 NOTE — PROGRESS NOTES
Pt not seen for skilled OT due to:     [ ]  Nausea/vomiting  [ ]  Eating  [ ]  Pain  [ ]  Pt lethargic  [X]  Off Unit 134[address] at Ultrasound  Other:    Will follow up with pt as time permits.     Thank you for this referral.  Jyoti Awad MS OTR/L

## 2022-09-29 LAB
ANION GAP SERPL CALC-SCNC: 5 MMOL/L (ref 3–18)
APPEARANCE UR: ABNORMAL
BACTERIA URNS QL MICRO: ABNORMAL /HPF
BILIRUB UR QL: NEGATIVE
BUN SERPL-MCNC: 69 MG/DL (ref 7–18)
BUN/CREAT SERPL: 27 (ref 12–20)
CALCIUM SERPL-MCNC: 8.9 MG/DL (ref 8.5–10.1)
CHLORIDE SERPL-SCNC: 104 MMOL/L (ref 100–111)
CO2 SERPL-SCNC: 25 MMOL/L (ref 21–32)
COLOR UR: YELLOW
CREAT SERPL-MCNC: 2.55 MG/DL (ref 0.6–1.3)
CREAT UR-MCNC: 40 MG/DL (ref 30–125)
EPITH CASTS URNS QL MICRO: ABNORMAL /LPF (ref 0–5)
ERYTHROCYTE [DISTWIDTH] IN BLOOD BY AUTOMATED COUNT: 14.2 % (ref 11.6–14.5)
GLUCOSE BLD STRIP.AUTO-MCNC: 109 MG/DL (ref 70–110)
GLUCOSE BLD STRIP.AUTO-MCNC: 163 MG/DL (ref 70–110)
GLUCOSE BLD STRIP.AUTO-MCNC: 237 MG/DL (ref 70–110)
GLUCOSE BLD STRIP.AUTO-MCNC: 325 MG/DL (ref 70–110)
GLUCOSE SERPL-MCNC: 137 MG/DL (ref 74–99)
GLUCOSE UR STRIP.AUTO-MCNC: NEGATIVE MG/DL
HCT VFR BLD AUTO: 33 % (ref 35–45)
HGB BLD-MCNC: 10.4 G/DL (ref 12–16)
HGB UR QL STRIP: NEGATIVE
KETONES UR QL STRIP.AUTO: NEGATIVE MG/DL
LEUKOCYTE ESTERASE UR QL STRIP.AUTO: ABNORMAL
MAGNESIUM SERPL-MCNC: 2 MG/DL (ref 1.6–2.6)
MCH RBC QN AUTO: 30.2 PG (ref 24–34)
MCHC RBC AUTO-ENTMCNC: 31.5 G/DL (ref 31–37)
MCV RBC AUTO: 95.9 FL (ref 78–100)
MUCOUS THREADS URNS QL MICRO: ABNORMAL /LPF
NITRITE UR QL STRIP.AUTO: NEGATIVE
NRBC # BLD: 0 K/UL (ref 0–0.01)
NRBC BLD-RTO: 0 PER 100 WBC
PH UR STRIP: 5 [PH] (ref 5–8)
PHOSPHATE SERPL-MCNC: 3.9 MG/DL (ref 2.5–4.9)
PLATELET # BLD AUTO: 206 K/UL (ref 135–420)
PMV BLD AUTO: 9.4 FL (ref 9.2–11.8)
POTASSIUM SERPL-SCNC: 4.9 MMOL/L (ref 3.5–5.5)
PROT UR STRIP-MCNC: ABNORMAL MG/DL
PROT UR-MCNC: 21 MG/DL
RBC # BLD AUTO: 3.44 M/UL (ref 4.2–5.3)
RBC #/AREA URNS HPF: NEGATIVE /HPF (ref 0–5)
SODIUM SERPL-SCNC: 134 MMOL/L (ref 136–145)
SODIUM UR-SCNC: 60 MMOL/L (ref 20–110)
SP GR UR REFRACTOMETRY: 1.01 (ref 1–1.03)
UROBILINOGEN UR QL STRIP.AUTO: 0.2 EU/DL (ref 0.2–1)
WBC # BLD AUTO: 6.1 K/UL (ref 4.6–13.2)
WBC URNS QL MICRO: >100 /HPF (ref 0–4)

## 2022-09-29 PROCEDURE — 74011250637 HC RX REV CODE- 250/637: Performed by: HOSPITALIST

## 2022-09-29 PROCEDURE — 85027 COMPLETE CBC AUTOMATED: CPT

## 2022-09-29 PROCEDURE — 94761 N-INVAS EAR/PLS OXIMETRY MLT: CPT

## 2022-09-29 PROCEDURE — 74011000250 HC RX REV CODE- 250: Performed by: HOSPITALIST

## 2022-09-29 PROCEDURE — 84100 ASSAY OF PHOSPHORUS: CPT

## 2022-09-29 PROCEDURE — 80048 BASIC METABOLIC PNL TOTAL CA: CPT

## 2022-09-29 PROCEDURE — 99232 SBSQ HOSP IP/OBS MODERATE 35: CPT | Performed by: HOSPITALIST

## 2022-09-29 PROCEDURE — 83735 ASSAY OF MAGNESIUM: CPT

## 2022-09-29 PROCEDURE — 74011250636 HC RX REV CODE- 250/636: Performed by: HOSPITALIST

## 2022-09-29 PROCEDURE — 74011250637 HC RX REV CODE- 250/637: Performed by: INTERNAL MEDICINE

## 2022-09-29 PROCEDURE — 74011636637 HC RX REV CODE- 636/637: Performed by: HOSPITALIST

## 2022-09-29 PROCEDURE — 65270000046 HC RM TELEMETRY

## 2022-09-29 PROCEDURE — 99223 1ST HOSP IP/OBS HIGH 75: CPT | Performed by: INTERNAL MEDICINE

## 2022-09-29 PROCEDURE — 74011000250 HC RX REV CODE- 250: Performed by: INTERNAL MEDICINE

## 2022-09-29 PROCEDURE — 94640 AIRWAY INHALATION TREATMENT: CPT

## 2022-09-29 PROCEDURE — 77010033678 HC OXYGEN DAILY

## 2022-09-29 PROCEDURE — 36415 COLL VENOUS BLD VENIPUNCTURE: CPT

## 2022-09-29 PROCEDURE — 74011636637 HC RX REV CODE- 636/637: Performed by: INTERNAL MEDICINE

## 2022-09-29 PROCEDURE — 82962 GLUCOSE BLOOD TEST: CPT

## 2022-09-29 PROCEDURE — 2709999900 HC NON-CHARGEABLE SUPPLY

## 2022-09-29 RX ORDER — GUAIFENESIN 600 MG/1
600 TABLET, EXTENDED RELEASE ORAL EVERY 12 HOURS
Status: DISCONTINUED | OUTPATIENT
Start: 2022-09-29 | End: 2022-09-29

## 2022-09-29 RX ORDER — FUROSEMIDE 40 MG/1
40 TABLET ORAL DAILY
Status: DISCONTINUED | OUTPATIENT
Start: 2022-09-30 | End: 2022-09-30

## 2022-09-29 RX ORDER — INSULIN GLARGINE 100 [IU]/ML
15 INJECTION, SOLUTION SUBCUTANEOUS DAILY
Status: DISCONTINUED | OUTPATIENT
Start: 2022-09-30 | End: 2022-10-03 | Stop reason: HOSPADM

## 2022-09-29 RX ORDER — LOPERAMIDE HYDROCHLORIDE 2 MG/1
2 CAPSULE ORAL ONCE
Status: COMPLETED | OUTPATIENT
Start: 2022-09-29 | End: 2022-09-29

## 2022-09-29 RX ORDER — INSULIN GLARGINE 100 [IU]/ML
20 INJECTION, SOLUTION SUBCUTANEOUS
Status: DISCONTINUED | OUTPATIENT
Start: 2022-09-29 | End: 2022-10-03 | Stop reason: HOSPADM

## 2022-09-29 RX ORDER — ARFORMOTEROL TARTRATE 15 UG/2ML
15 SOLUTION RESPIRATORY (INHALATION)
Status: DISCONTINUED | OUTPATIENT
Start: 2022-09-29 | End: 2022-10-03 | Stop reason: HOSPADM

## 2022-09-29 RX ORDER — GUAIFENESIN 600 MG/1
1200 TABLET, EXTENDED RELEASE ORAL EVERY 12 HOURS
Status: DISCONTINUED | OUTPATIENT
Start: 2022-09-29 | End: 2022-10-03 | Stop reason: HOSPADM

## 2022-09-29 RX ORDER — BUDESONIDE 0.25 MG/2ML
250 INHALANT ORAL
Status: DISCONTINUED | OUTPATIENT
Start: 2022-09-29 | End: 2022-10-03 | Stop reason: HOSPADM

## 2022-09-29 RX ORDER — BUDESONIDE AND FORMOTEROL FUMARATE DIHYDRATE 80; 4.5 UG/1; UG/1
2 AEROSOL RESPIRATORY (INHALATION)
Status: DISCONTINUED | OUTPATIENT
Start: 2022-09-29 | End: 2022-09-29 | Stop reason: CLARIF

## 2022-09-29 RX ADMIN — APIXABAN 5 MG: 5 TABLET, FILM COATED ORAL at 20:28

## 2022-09-29 RX ADMIN — Medication 20 UNITS: at 21:22

## 2022-09-29 RX ADMIN — APIXABAN 5 MG: 5 TABLET, FILM COATED ORAL at 09:08

## 2022-09-29 RX ADMIN — APIXABAN 5 MG: 5 TABLET, FILM COATED ORAL at 01:06

## 2022-09-29 RX ADMIN — BUDESONIDE 250 MCG: 0.25 SUSPENSION RESPIRATORY (INHALATION) at 19:44

## 2022-09-29 RX ADMIN — SODIUM CHLORIDE, PRESERVATIVE FREE 10 ML: 5 INJECTION INTRAVENOUS at 14:27

## 2022-09-29 RX ADMIN — METHYLPREDNISOLONE SODIUM SUCCINATE 40 MG: 40 INJECTION, POWDER, FOR SOLUTION INTRAMUSCULAR; INTRAVENOUS at 01:05

## 2022-09-29 RX ADMIN — HYDRALAZINE HYDROCHLORIDE 50 MG: 50 TABLET, FILM COATED ORAL at 09:08

## 2022-09-29 RX ADMIN — ISOSORBIDE DINITRATE 20 MG: 10 TABLET ORAL at 09:08

## 2022-09-29 RX ADMIN — IPRATROPIUM BROMIDE AND ALBUTEROL SULFATE 3 ML: .5; 3 SOLUTION RESPIRATORY (INHALATION) at 19:46

## 2022-09-29 RX ADMIN — Medication 2 UNITS: at 21:17

## 2022-09-29 RX ADMIN — PANTOPRAZOLE SODIUM 40 MG: 40 TABLET, DELAYED RELEASE ORAL at 06:10

## 2022-09-29 RX ADMIN — LOPERAMIDE HYDROCHLORIDE 2 MG: 2 CAPSULE ORAL at 20:28

## 2022-09-29 RX ADMIN — IPRATROPIUM BROMIDE AND ALBUTEROL SULFATE 3 ML: .5; 3 SOLUTION RESPIRATORY (INHALATION) at 13:13

## 2022-09-29 RX ADMIN — BUDESONIDE 250 MCG: 0.25 SUSPENSION RESPIRATORY (INHALATION) at 08:52

## 2022-09-29 RX ADMIN — ISOSORBIDE DINITRATE 20 MG: 10 TABLET ORAL at 16:48

## 2022-09-29 RX ADMIN — GUAIFENESIN 1200 MG: 600 TABLET, EXTENDED RELEASE ORAL at 20:28

## 2022-09-29 RX ADMIN — ATENOLOL 25 MG: 25 TABLET ORAL at 09:08

## 2022-09-29 RX ADMIN — HYDRALAZINE HYDROCHLORIDE 50 MG: 50 TABLET, FILM COATED ORAL at 16:48

## 2022-09-29 RX ADMIN — IPRATROPIUM BROMIDE AND ALBUTEROL SULFATE 3 ML: .5; 3 SOLUTION RESPIRATORY (INHALATION) at 08:00

## 2022-09-29 RX ADMIN — ARFORMOTEROL TARTRATE 15 MCG: 15 SOLUTION RESPIRATORY (INHALATION) at 08:52

## 2022-09-29 RX ADMIN — ROSUVASTATIN CALCIUM 10 MG: 10 TABLET, FILM COATED ORAL at 21:23

## 2022-09-29 RX ADMIN — GUAIFENESIN 1200 MG: 600 TABLET, EXTENDED RELEASE ORAL at 09:08

## 2022-09-29 RX ADMIN — CEFTRIAXONE 1 G: 1 INJECTION, POWDER, FOR SOLUTION INTRAMUSCULAR; INTRAVENOUS at 16:48

## 2022-09-29 RX ADMIN — ISOSORBIDE DINITRATE 20 MG: 10 TABLET ORAL at 21:23

## 2022-09-29 RX ADMIN — Medication 8 UNITS: at 12:24

## 2022-09-29 RX ADMIN — Medication 15 UNITS: at 09:06

## 2022-09-29 RX ADMIN — Medication 4 UNITS: at 09:06

## 2022-09-29 RX ADMIN — ARFORMOTEROL TARTRATE 15 MCG: 15 SOLUTION RESPIRATORY (INHALATION) at 19:43

## 2022-09-29 RX ADMIN — HYDRALAZINE HYDROCHLORIDE 50 MG: 50 TABLET, FILM COATED ORAL at 21:23

## 2022-09-29 NOTE — PROGRESS NOTES
Pulmicort Respules® (budesonide) 0.25mg/2ml BID + Arformoterol 15mcg/2ml BIDwas therapeutically interchanged for Symbicort® (budesonide/formoterol) 80/4.5mcg BID per the P&T Committee approved Therapeutic Interchanges Policy.

## 2022-09-29 NOTE — PROGRESS NOTES
In Patient Progress note    Admit Date: 9/27/2022    Impression:     #1 Acute kidney injury on chronic kidney disease stage 4 , patient's baseline creatinine around 2.5, presented with a creatinine of 4.1. Etiology not clear as it seemed to resolved rapidly with minimal intervention .  She does have 10 lbs wt gain over the month , off lasix for 3 weeks , so cardiorenal syndrome may be the etiology , patient did get a couple of doses of lasix last few days , along with fluid restriction may have improved her volume status and her renal functions   #2 chronic kidney disease stage IV suspect diabetic nephropathy and hypertension nephrosclerosis, sub nephrotic proteinuria  #3 hypertension appears to be reasonably controlled  #4 dyspnea on exertion and shortness of breath, etiology unclear but suspect patient has diastolic CHF , 10 lbs wt gain while being off diuretics   #5 chronic diastolic CHF appears to be compensated, restart maintenance  lasix   #6 COPD and asthma, no acute exacerbation   #7 diabetes mellitus type 2  #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, 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  #9 history of psoriasis     Plan:  #1 strict intake output charting  #2 Lasix 40 mg PO daily , fluid restrict 1500 ml daily   #3 echo report reviewed  #4 monitor renal functions and electrolytes  #5 evaluation of exertional dyspnea and hypoxia per primary team  #6 blood pressure is in good range, hold lisinopril continue atenolol and hydralazine   #7 avoid IV contrast nephrotoxins and renally dose medications for EGFR of less than 30     Please call with questions     Rubens Carvajal MD [facility]  Cell [phone]  Pager: [phone]        Subjective:     - SOB on exertion   - respiratory - stable  - hemodynamics - stable, no pressrs  - UOP-ok  - Nutrition -ok    Objective:     Visit Vitals  /64   Pulse 75   Temp 97.3 °F (36.3 °C)   Resp 17   Ht 5' 3\" (1.6 m)   Wt 78.9 kg (174 lb)   SpO2 95%   BMI 30.82 kg/m²         Intake/Output Summary (Last 24 hours) at 9/29/2022 1506  Last data filed at 9/29/2022 1313  Gross per 24 hour   Intake 840 ml   Output 650 ml   Net 190 ml       Physical Exam:     Patient is in no apparent distress. HEENT: Head is normocephalic and atraumatic. Neck: no cervical lymphadenopathy or thyromegaly. Lungs: good air entry, clear to auscultation bilaterally. Trachea at the midline. Cardiovascular system: S1, S2, regular rate and rhythm. No murmurs, gallops or rubs. No jvd. Abdomen: soft, non tender, non distended.    Extremities: trace park edema      Data Review:    Recent Labs     09/29/22  0243   WBC 6.1   RBC 3.44*   HCT 33.0*   MCV 95.9   MCH 30.2   MCHC 31.5   RDW 14.2     Recent Labs     09/29/22  0243 09/28/22  0434 09/27/22  1908   BUN 69* 68* 70*   CREA 2.55* 2.69* 2.98*   CA 8.9 8.9 9.2   K 4.9 5.0 5.1   * 137 134*    106 104   CO2 25 26 25   PHOS 3.9  --   --    * 132* 152*       Lina Yee MD

## 2022-09-29 NOTE — PROGRESS NOTES
Bedside and Verbal shift change report given to Kori Galvan (oncoming nurse) by Sean White RN (offgoing nurse). Report included the following information SBAR, Kardex, Intake/Output, MAR, and Recent Results.

## 2022-09-29 NOTE — PROGRESS NOTES
Physician Progress Note      PATIENT:               Shane Portillo  CSN #:                  [id number]  :                       1940  ADMIT DATE:       2022 6:22 PM  [address] [place] DATE:  RESPONDING  PROVIDER #:        Nikita Ascencio MD          QUERY TEXT:    Dear Hospitalist  Patient admitted with worsening   SOB. Noted documentation of acute hypoxic  respiratory failure in attending progress notes on  . In order to support the diagnosis of acute respiratory failure, please include additional clinical indicators in your documentation. Or please document if the diagnosis of acute respiratory failure has been ruled out after further study. The medical record reflects the following:  Risk Factors: age;   CHF;   REMY  on  CKD 4;  COPD, not on home 02;  Clinical Indicators: RR  17  on admit and   not  >   25 since admit. Not in acute distress, speaks in short sentence while in bed  per pulm  consult- Acute on chronic shortness of breath; Lungs: Clear to auscultation bilaterally  Treatment: only  required 2L   NC  since admit. Acute Respiratory Failure Clinical Indicators per [employer] Training Guide and Quick Reference Guide:  pO2 < 60 mmHg or SpO2 (pulse oximetry) < 91% breathing room air  pCO2 > 50 and pH < 7.35  P/F ratio (pO2 / FIO2) < 300  pO2 decrease or pCO2 increase by 10 mmHg from baseline (if known)  Supplemental oxygen of 40% or more  Presence of respiratory distress, tachypnea, dyspnea, shortness of breath, wheezing  Unable to speak in complete sentences  Use of accessory muscles to breathe  Extreme anxiety and feeling of impending doom  Tripod position  Confusion/altered mental status/obtunded    Thank you,   Teresa Mcdaniel RN   CCDS  Options provided:  -- Acute hypoxic  Respiratory Failure as evidenced by, Please document evidence.   -- Acute hypoxic  Respiratory Failure ruled out after study  -- Other - I will add my own diagnosis  -- Disagree - Not applicable / Not valid  -- Disagree - Clinically unable to determine / Unknown  -- Refer to Clinical Documentation Reviewer    PROVIDER RESPONSE TEXT:    This patient is in acute hypoxic respiratory failure as evidenced by resting O2 sat 80% on RA.     Query created by: Amy Garcia on 9/29/2022 1:20 PM      Electronically signed by:  Chela Youssef MD [facility] MD 9/29/2022 4:36 PM

## 2022-09-29 NOTE — PROGRESS NOTES
[facility] Hospitalist Group  Progress Note    Patient: Betina Acevedo Age: 80 y.o. : 1940 MR#: [id number] SSN: [id number]  Date/Time: 2022    Subjective:     Mucolytics dose increased per pulmonology. Solu-Medrol discontinued. Patient seen and examined at bedside, she reports she was wheezing overnight. Also she wheezes with any movement. She is still dyspneic with minimal exertion. Son and granddaughter at bedside stay in room w/ patient's permission. Son expresses concern patient may have PE, states in the past when she's gotten this short of breath she had clots. Patient reports she had dysuria yesterday, but not today. sometimes urgency, and frequency. Loose stool started this am, several episodes. She denies hematochezia. Assessment/Plan:     1. Dyspnea on exertion multifactorial.  2. Cardiac catheterization 2022 with normal epicardial coronary arteries, normal LVEF 60% with normal wall motion. Nuclear stress test 2022 with moderate to large area of reversible ischemia extending from the septal wall across the anterior apical wall to the lateral wall. 3. L popliteal nonocclusive DVT by duplex US 2022. 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 4.  REMY on CKD 3. Nephrology follows. renal ultrasound no hydronephrosis. 5.  Acute respiratory failure with hypoxia, resting O2 sat 80%  6. DM2 with steroid-induced hyperglycemia. .  Lantus increased, sliding scale insulin, ADA diet. 7. dyslipidemia  8. Hx recurrent UTI's  9. Former tobacco.  10. Obesity Body mass index is 30.82 kg/m². 11. Hx of ischemic colitis . 12.  hx syncope with negative tilt table 2022  13. UTI. Nsg to send cx, then start ceftriaxone. 14. DVT prophylaxis  15. Full code. PT OT. Assess oxygen needs at discharge. Discussed on IDR. Son at bedside, all questions answered to the best my ability.     Additional Notes:      Case discussed with: [x]Patient  [x]Family  [x]Nursing  [x]Case Management  DVT Prophylaxis:  []Lovenox  []Hep SQ  []SCDs  []Coumadin   []On Heparin gtt  [x] noac    Objective:   VS: Visit Vitals  /66 (BP 1 Location: Right lower arm, BP Patient Position: Supine;Semi fowlers)   Pulse 80   Temp 97.9 °F (36.6 °C)   Resp 17   Ht 5' 3\" (1.6 m)   Wt 78.9 kg (174 lb)   SpO2 95%   BMI 30.82 kg/m²        Tmax/24hrs: Temp (24hrs), Av.9 °F (36.6 °C), Min:97 °F (36.1 °C), Max:98.9 °F (37.2 °C)    Input/Output:   Intake/Output Summary (Last 24 hours) at 2022 1108  Last data filed at 2022 0914  Gross per 24 hour   Intake 840 ml   Output 650 ml   Net 190 ml         General: Awake alert and oriented x4. Appears several years younger than her stated age. Speaking in full sentences on supplemental oxygen. HEENT normocephalic atraumatic pupils equally round reactive to light. Oropharynx clear  Cardiovascular: Regular rate and rhythm. Pulmonary: Improved air entry. No rhonchi.   GI: Soft nontender nondistended no  Extremities: Nonpitting edema bilaterally  Neuro no focal deficit  Additional: No rash to visible skin    Labs:    Recent Results (from the past 24 hour(s))   ECHO ADULT COMPLETE    Collection Time: 22 11:13 AM   Result Value Ref Range    Fractional Shortening 2D 30 28 - 44 %    LVIDd Index 2.42 cm/m2    LVIDs Index 1.70 cm/m2    LV RWT Ratio 0.50     LV Mass 2D 180.0 (A) 67 - 162 g    LV Mass 2D Index 98.9 (A) 43 - 95 g/m2    MV E/A 0.92     E/E' Ratio (Averaged) 15.03     E/E' Lateral 13.67     E/E' Septal 16.40     LA Volume Index A/L 33 16 - 34 mL/m2    LVOT Stroke Volume Index 33.3 mL/m2    LVOT Area 2.5 cm2    LA Volume Index 2C 24 16 - 34 mL/m2    LA Volume Index 4C 41 (A) 16 - 34 mL/m2    Ao Root Index 1.43 cm/m2    PASP 29 mmHg    IVSd 1.2 (A) 0.6 - 0.9 cm    LVIDd 4.4 3.9 - 5.3 cm    LVIDs 3.1 cm    LVOT Diameter 1.8 cm    LVPWd 1.1 (A) 0.6 - 0.9 cm    LVOT Peak Gradient 5 mmHg    LVOT Mean Gradient 2 mmHg    LVOT SV 60.5 ml    LVOT Peak Velocity 1.1 m/s    LVOT VTI 23.8 cm    RV Free Wall Peak S' 14 cm/s    LA Volume A/L 60 mL    LA Volume 2C 43 22 - 52 mL    LA Volume 4C 75 (A) 22 - 52 mL    MV A Velocity 0.89 m/s    MV E Wave Deceleration Time 231.1 ms    MV E Velocity 0.82 m/s    LV E' Lateral Velocity 6 cm/s    LV E' Septal Velocity 5 cm/s    TAPSE 2.6 1.7 cm    TR Peak Gradient 28 mmHg    TR Max Velocity 2.65 m/s    Aortic Root 2.6 cm   GLUCOSE, POC    Collection Time: 09/28/22 12:04 PM   Result Value Ref Range    Glucose (POC) 166 (H) 70 - 110 mg/dL   PTT    Collection Time: 09/28/22 12:58 PM   Result Value Ref Range    aPTT 148.9 (HH) 23.0 - 36.4 SEC   GLUCOSE, POC    Collection Time: 09/28/22  4:04 PM   Result Value Ref Range    Glucose (POC) 130 (H) 70 - 110 mg/dL   NT-PRO BNP    Collection Time: 09/28/22  9:27 PM   Result Value Ref Range    NT pro- 0 - 1,800 PG/ML   GLUCOSE, POC    Collection Time: 09/28/22 10:28 PM   Result Value Ref Range    Glucose (POC) 143 (H) 70 - 110 mg/dL   URINALYSIS W/ RFLX MICROSCOPIC    Collection Time: 09/28/22 11:57 PM   Result Value Ref Range    Color YELLOW      Appearance CLOUDY      Specific gravity 1.007 1.005 - 1.030      pH (UA) 5.0 5.0 - 8.0      Protein TRACE (A) NEG mg/dL    Glucose Negative NEG mg/dL    Ketone Negative NEG mg/dL    Bilirubin Negative NEG      Blood Negative NEG      Urobilinogen 0.2 0.2 - 1.0 EU/dL    Nitrites Negative NEG      Leukocyte Esterase LARGE (A) NEG     SODIUM, UR, RANDOM    Collection Time: 09/28/22 11:57 PM   Result Value Ref Range    Sodium,urine random 60 20 - 110 MMOL/L   CREATININE, UR, RANDOM    Collection Time: 09/28/22 11:57 PM   Result Value Ref Range    Creatinine, urine 40.00 30 - 125 mg/dL   PROTEIN URINE, RANDOM    Collection Time: 09/28/22 11:57 PM   Result Value Ref Range    Protein, urine random 21 (H) <11.9 mg/dL   URINE MICROSCOPIC ONLY    Collection Time: 09/28/22 11:57 PM   Result Value Ref Range    WBC >100 (H) 0 - 4 /hpf    RBC Negative 0 - 5 /hpf    Epithelial cells FEW 0 - 5 /lpf    Bacteria 1+ (A) NEG /hpf    Mucus FEW (A) NEG /lpf   MAGNESIUM    Collection Time: 09/29/22  2:43 AM   Result Value Ref Range    Magnesium 2.0 1.6 - 2.6 mg/dL   CBC W/O DIFF    Collection Time: 09/29/22  2:43 AM   Result Value Ref Range    WBC 6.1 4.6 - 13.2 K/uL    RBC 3.44 (L) 4.20 - 5.30 M/uL    HGB 10.4 (L) 12.0 - 16.0 g/dL    HCT 33.0 (L) 35.0 - 45.0 %    MCV 95.9 78.0 - 100.0 FL    MCH 30.2 24.0 - 34.0 PG    MCHC 31.5 31.0 - 37.0 g/dL    RDW 14.2 11.6 - 14.5 %    PLATELET 239 586 - 283 K/uL    MPV 9.4 9.2 - 11.8 FL    NRBC 0.0 0  WBC    ABSOLUTE NRBC 0.00 0.00 - 1.81 K/uL   METABOLIC PANEL, BASIC    Collection Time: 09/29/22  2:43 AM   Result Value Ref Range    Sodium 134 (L) 136 - 145 mmol/L    Potassium 4.9 3.5 - 5.5 mmol/L    Chloride 104 100 - 111 mmol/L    CO2 25 21 - 32 mmol/L    Anion gap 5 3.0 - 18 mmol/L    Glucose 137 (H) 74 - 99 mg/dL    BUN 69 (H) 7.0 - 18 MG/DL    Creatinine 2.55 (H) 0.6 - 1.3 MG/DL    BUN/Creatinine ratio 27 (H) 12 - 20      GFR est AA 22 (L) >60 ml/min/1.73m2    GFR est non-AA 18 (L) >60 ml/min/1.73m2    Calcium 8.9 8.5 - 10.1 MG/DL   PHOSPHORUS    Collection Time: 09/29/22  2:43 AM   Result Value Ref Range    Phosphorus 3.9 2.5 - 4.9 MG/DL   GLUCOSE, POC    Collection Time: 09/29/22  8:31 AM   Result Value Ref Range    Glucose (POC) 237 (H) 70 - 110 mg/dL   GLUCOSE, POC    Collection Time: 09/29/22 11:07 AM   Result Value Ref Range    Glucose (POC) 325 (H) 70 - 110 mg/dL     Additional Data Reviewed:      Signed By: Ana Carvajal MD     September 29, 2022 10:30 AM

## 2022-09-29 NOTE — PROGRESS NOTES
Reason for Admission:  Acute CHF (congestive heart failure) ([facility]) [I50.9]  REMY (acute kidney injury) ([facility] [address] [N17.9]                 RUR Score:    13%            Plan for utilizing home health:    Declined home health services                      Likelihood of Readmission:   LOW                         Transition of Care Plan:              Initial assessment completed with patient. Cognitive status of patient: oriented to time, place, person and situation. Face sheet information confirmed:  yes. The patient designates Faiza Chavez, son ([phone]) to participate in her discharge plan and to receive any needed information. This patient lives alone in a single family home. Patient was able to navigate steps as needed. Prior to hospitalization, patient was considered to be independent with ADLs/IADLS : yes . I      Patient has a current ACP document on file: no      The patient's son will be available to transport patient home upon discharge. The patient reported no available in the home. Patient is not currently active with home health. Patient has stayed in a skilled nursing facility or rehab years ago. .      This patient is on dialysis :no      Freedom of choice signed: no.    Currently, the discharge plan is Home. Patient stated \" I don't know why they think I need home health. I don't want it . I do my own thing. \"    10:05 am: Updated Dr. Zahida Amato during [facility]. CM will continue to monitor and assist with transition of care. The patient states that she can obtain her medications from the pharmacy, and take her medications as directed. Patient's current insurance is Medicare Part A & B and        Care Management Interventions  PCP Verified by CM:  Yes (PCP is Al Viveros MD.)  Mode of Transport at Discharge: Self  Transition of Care Consult (CM Consult): Discharge Planning  Discharge Durable Medical Equipment: No  Physical Therapy Consult: Yes  Occupational Therapy Consult: Yes  Speech Therapy Consult: No  Support Systems: Child(gunnar)  Confirm Follow Up Transport: Self  Discharge Location  Patient Expects to be Discharged to[de-identified] Home        CIRILO Costa, RN  Pager # [phone]  Care Manager

## 2022-09-29 NOTE — PROGRESS NOTES
Progress Note:  Weaned to 3 lpm resting most of the night. No s/s of distress,uneventful night, call bell within reach.   Patient Vitals for the past 12 hrs:   Temp Pulse Resp BP SpO2   09/29/22 0342 97 °F (36.1 °C) 78 16 (!) 109/59 95 %   09/28/22 2348 98.9 °F (37.2 °C) 89 18 (!) 111/55 98 %   09/28/22 2105 -- -- -- -- 99 %   09/28/22 1952 97.8 °F (36.6 °C) 67 18 (!) 125/53 95 %

## 2022-09-29 NOTE — CONSULTS
Consult Note        Consult requested by: Vee Bro MD    ADMIT DATE: 9/27/2022    CONSULT DATE: September 29, 2022           Admission diagnosis: exertional dyspnea  Reason for Nephrology Consultation: REMY CKD4    Assessment and plan    #1 acute kidney injury on chronic kidney disease stage 4 , patient's baseline creatinine around 2.5, presented with a creatinine of 4.1. Etiology unclear secondary to prerenal azotemia as resolved with almost no intervention. She did get some fluids in terms of drips p.o. intake. Her diuretics have been stopped for the last 3 weeks her REMY does not appear to be secondary to diuresis. She has been able to urinate okay looks okay. There is no edema or signs of fluid overload clinically and patient appears to be euvolemic. #2 chronic kidney disease stage IV suspect at nephropathy and hypertension nephrosclerosis, need to check for proteinuria  #3 hypertension appears to be reasonably controlled  #4 dyspnea on exertion and shortness of breath, her volume status does appear okay.   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 female [address] [facility] with history of hypertension, dyslipidemia, chronic diastolic CHF, COPD, history of PE/DVT on Eliquis, current IVC filter in place, type 2 diabetes, psoriasis on Humira who presents to the [facility] with worsening shortness of breath. Apparently patient has had shortness of breath intermittently for the past few weeks. Seems to be more exertional shortness of breath. Denies any chest discomfort. She did have no extremity swelling. Apparently she was not taking her Lasix for the last 3 weeks. But she has not noticed any changes in her urine output or extremity swelling. She does see nephrology and her creatinine baseline has been in the 2.5 range recently. She has had CKD for the last several years. On presentation to the emergency room at [facility] she was found to have a creatinine of 4.13, BUN of 67, hyperkalemia, normal troponin. Chest x-ray showed mild pulmonary congestion. .  Duplex of legs showed nonocclusive DVT within the left popliteal vein. Patient has been compliant on Eliquis. She was started on heparin drip and was transferred to [facility] for evaluation.   Nephrology was consulted for REMY on CKD as well as volume status      Past Medical History:   Diagnosis Date    Asthma     CAD (coronary artery disease)     Diabetes ([address]     Headache     HTN (hypertension)     Hypercholesteremia     Leg cramps     on potassium lasix    Pulmonary emboli (HCC)     UTI (urinary tract infection)       Past Surgical History:   Procedure Laterality Date    COLONOSCOPY N/A 6/5/2020    COLONOSCOPY WITH BXS performed by Esther Doan MD at [address]       HX CARPAL TUNNEL RELEASE  04/22/2014    HX HYSTERECTOMY  1992    HX TUBAL LIGATION      laparoscopic    IR [address] [facility] IVC FILTER  6/10/2020       Social History     Socioeconomic History    Marital status:      Spouse name: Not on file    Number of children: Not on file    Years of education: Not on file    Highest education level: Not on file   Occupational History    Not on file Tobacco Use    Smoking status: Former    Smokeless tobacco: Never   Substance and Sexual Activity    Alcohol use: Never    Drug use: Not on file    Sexual activity: Never   Other Topics Concern    Not on file   Social History Narrative    Not on file     Social Determinants of Health     Financial Resource Strain: Not on file   Food Insecurity: Not on file   Transportation Needs: Not on file   Physical Activity: Not on file   Stress: Not on file   Social Connections: Not on file   Intimate Partner Violence: Not on file   Housing Stability: Not on file       No family history on file. No Known Allergies     Home Medications:     Medications Prior to Admission   Medication Sig    amLODIPine (NORVASC) 10 mg tablet Take 10 mg by mouth daily. hydrALAZINE (APRESOLINE) 50 mg tablet Take 50 mg by mouth three (3) times daily. isosorbide dinitrate (ISORDIL) 20 mg tablet Take 20 mg by mouth three (3) times daily. apixaban (ELIQUIS) 5 mg tablet Take 1 Tab by mouth two (2) times a day. albuterol-ipratropium (DUO-NEB) 2.5 mg-0.5 mg/3 ml nebu 3 mL by Nebulization route every six (6) hours as needed for Wheezing.    ergocalciferol (ERGOCALCIFEROL) 50,000 unit capsule Vitamin D2 1,250 mcg (50,000 unit) capsule    albuterol (PROVENTIL HFA, VENTOLIN HFA, PROAIR HFA) 90 mcg/actuation inhaler Take 1 Puff by inhalation every six (6) hours as needed. rosuvastatin (CRESTOR) 10 mg tablet Take 10 mg by mouth nightly. insulin glargine (LANTUS) 100 unit/mL injection by SubCUTAneous route nightly. 26 units in AM, 15 units in PM    atenolol (TENORMIN) 25 mg tablet Take 25 mg by mouth daily. furosemide (LASIX) 40 mg tablet Take 40 mg by mouth daily. omeprazole (PRILOSEC) 20 mg capsule Take 20 mg by mouth daily. HYDROcodone-acetaminophen (NORCO)  mg tablet Take 1 Tablet by mouth every eight (8) hours as needed. lisinopril (PRINIVIL, ZESTRIL) 10 mg tablet Take  by mouth daily.     adalimumab (HUMIRA) 40 mg/0.8 mL injection pen 40 mg by SubCUTAneous route every seven (7) days. tiotropium (SPIRIVA) 18 mcg inhalation capsule Take 1 Cap by inhalation daily. FREESTYLE LITE STRIPS strip     FREESTYLE LANCETS 28 gauge misc     fluticasone propion-salmeteroL (ADVAIR/WIXELA) 100-50 mcg/dose diskus inhaler Take 1 Puff by inhalation every twelve (12) hours.        Current Inpatient Medications:     Current Facility-Administered Medications   Medication Dose Route Frequency    insulin glargine (LANTUS) injection 15 Units  15 Units SubCUTAneous Q12H    [Held by provider] furosemide (LASIX) injection 40 mg  40 mg IntraVENous BID    apixaban (ELIQUIS) tablet 5 mg  5 mg Oral Q12H    methylPREDNISolone (PF) (SOLU-MEDROL) injection 40 mg  40 mg IntraVENous Q8H    guaiFENesin ER (MUCINEX) tablet 600 mg  600 mg Oral Q12H    sodium chloride (NS) flush 5-40 mL  5-40 mL IntraVENous Q8H    sodium chloride (NS) flush 5-40 mL  5-40 mL IntraVENous PRN    acetaminophen (TYLENOL) tablet 650 mg  650 mg Oral Q6H PRN    Or    acetaminophen (TYLENOL) suppository 650 mg  650 mg Rectal Q6H PRN    polyethylene glycol (MIRALAX) packet 17 g  17 g Oral DAILY PRN    promethazine (PHENERGAN) tablet 12.5 mg  12.5 mg Oral Q6H PRN    Or    ondansetron (ZOFRAN) injection 4 mg  4 mg IntraVENous Q6H PRN    insulin lispro (HUMALOG) injection   SubCUTAneous AC&HS    glucose chewable tablet 16 g  4 Tablet Oral PRN    glucagon (GLUCAGEN) injection 1 mg  1 mg IntraMUSCular PRN    budesonide (PULMICORT) 500 mcg/2 ml nebulizer suspension  500 mcg Nebulization BID RT    And    arformoteroL (BROVANA) neb solution 15 mcg  15 mcg Nebulization BID RT    pantoprazole (PROTONIX) tablet 40 mg  40 mg Oral ACB    albuterol-ipratropium (DUO-NEB) 2.5 MG-0.5 MG/3 ML  3 mL Nebulization Q6HWA RT    atenoloL (TENORMIN) tablet 25 mg  25 mg Oral DAILY    hydrALAZINE (APRESOLINE) tablet 50 mg  50 mg Oral TID    isosorbide dinitrate (ISORDIL) tablet 20 mg  20 mg Oral TID    rosuvastatin (CRESTOR) tablet 10 mg  10 mg Oral QHS         Physical Assessment:     Vitals:    09/28/22 1600 09/28/22 1952 09/28/22 2105 09/28/22 2348   BP:  (!) 125/53  (!) 111/55   Pulse: 74 67  89   Resp:  18  18   Temp:  97.8 °F (36.6 °C)  98.9 °F (37.2 °C)   SpO2:  95% 99% 98%   Weight:       Height:         Last 3 Recorded Weights in this Encounter    09/27/22 2017 09/28/22 1015   Weight: 79.3 kg (174 lb 13.2 oz) 78.9 kg (174 lb)     Admission weight: Weight: 79.3 kg (174 lb 13.2 oz) (09/27/22 2017)      Intake/Output Summary (Last 24 hours) at 9/29/2022 0107  Last data filed at 9/29/2022 0001  Gross per 24 hour   Intake 840 ml   Output 600 ml   Net 240 ml       Patient is in no apparent distress. HEENT: Head is normocephalic and atraumatic. Neck: no cervical lymphadenopathy or thyromegaly. Lungs: good air entry, clear to auscultation bilaterally. Trachea at the midline. Cardiovascular system: S1, S2, regular rate and rhythm. No murmurs, gallops or rubs. No jvd. Abdomen: soft, non tender, non distended. Extremities: no clubbing, cyanosis or edema. Data Review:    Labs: Results:       Chemistry Recent Labs     09/28/22  0434 09/27/22  1908   * 152*    134*   K 5.0 5.1    104   CO2 26 25   BUN 68* 70*   CREA 2.69* 2.98*   CA 8.9 9.2   AGAP 5 5   BUCR 25* 23*         CBC w/Diff Recent Labs     09/28/22  0434 09/27/22  1908   WBC 6.2 7.4   RBC 3.72* 4.39   HGB 11.3* 13.1   HCT 35.5 41.8    247   GRANS 58 59   LYMPH 21 21   EOS 3 3         Iron/Ferritin No results for input(s): IRON in the last 72 hours. No lab exists for component: TIBCCALC   PTH/VIT D No results for input(s): PTH in the last 72 hours.     No lab exists for component: HERNANDO Shane MD  9/29/2022  1:07 AM      September 29, 2022

## 2022-09-29 NOTE — DIABETES MGMT
Diabetes/ Glycemic Control Plan of Care    Recommendations:   1.) increase bedtime lantus dose from 15 units to 20 units. Order obtained. 2.) modify correctional lispro insulin to very resistant dose per protocol. Done. Assessment: Patient with history of T2DM on Lantus insulin 15 units daily every morning and 26 units daily at bedtime was admitted on 9/27/2022 with report of worsening shortness of breath. POC BG 9/29/2022: 237, 225. Discussed with patient and she reported taking higher dose of lantus insulin at home. Noted patient received a one time dose of IV Solumedrol 40 mg at 1 AM today. DX:   1. Shortness of breath [R06.02 (ICD-10-CM)]           Fasting/ Morning blood glucose:   Lab Results   Component Value Date/Time    Glucose 137 (H) 09/29/2022 02:43 AM    Glucose (POC) 325 (H) 09/29/2022 11:07 AM    Glucose (POC) 149 (H) 06/16/2020 11:41 AM     IV Fluids containing dextrose: none    Steroids:  None    Blood glucose values: Within target range (70-180mg/dL):  NO    Current insulin orders:   Basal lantus insulin 15 units daily every morning  Basal lantus insulin 20 units daily at bedtime  Correctional lispro insulin. Modified to very resistant dose    Total Daily Dose previous 24 hours: 32 units of insulin  Lantus: 30 units  Lispro: 2 units    Current A1c:   Lab Results   Component Value Date/Time    Hemoglobin A1c 6.1 (H) 09/27/2022 07:08 PM      equivalent  to ave Blood Glucose of 128 mg/dl for 2-3 months prior to admission    Adequate glycemic control PTA: YES    Nutrition/Diet:   Active Orders   Diet    ADULT DIET Regular; 3 carb choices (45 gm/meal); Low Fat/Low Chol/High Fiber/JACQUE; No Salt Added (3-4 gm); Low Potassium (Less than 3000 mg/day);  Low Phosphorus (Less than 1000 mg); 1200 ml      Meal Intake:  Patient Vitals for the past 168 hrs:   % Diet Eaten   09/29/22 1313 76 - 100%   09/28/22 1336 76 - 100%   09/28/22 0838 51 - 75%     Supplement Intake:  Patient Vitals for the past 168 hrs:   Supplement intake %   09/28/22 0838 0%       Home diabetes medications: Verified with patient on 9/29/2022  Key Antihyperglycemic Medications               insulin glargine (LANTUS) 100 unit/mL injection (Taking) by SubCUTAneous route nightly. 26 units in AM, 15 units in PM            Plan/Goals:   Blood glucose will be within target of 70 - 180 mg/dl within 72 hours  Reinforce dietary and medication compliance at home.             Education:  [] Refer to Diabetes Education Record                       [x] Education not indicated at this time     Remy Almonte RN [facility]  Office: [phone]

## 2022-09-29 NOTE — PROGRESS NOTES
Problem: Falls - Risk of  Goal: *Absence of Falls  Description: Document Carolann Hoffmann Fall Risk and appropriate interventions in the flowsheet. Outcome: Progressing Towards Goal  Note: Fall Risk Interventions:  Mobility Interventions: Assess mobility with egress test    Mentation Interventions: Door open when patient unattended, Adequate sleep, hydration, pain control    Medication Interventions: Teach patient to arise slowly    Elimination Interventions: Call light in reach, Toilet paper/wipes in reach, Patient to call for help with toileting needs    History of Falls Interventions: Door open when patient unattended         Problem: Pressure Injury - Risk of  Goal: *Prevention of pressure injury  Description: Document David Scale and appropriate interventions in the flowsheet.   Outcome: Progressing Towards Goal  Note: Pressure Injury Interventions:     Moisture Interventions: Absorbent underpads    Activity Interventions: Pressure redistribution bed/mattress(bed type)    Mobility Interventions: HOB 30 degrees or less, Pressure redistribution bed/mattress (bed type)    Nutrition Interventions: Document food/fluid/supplement intake

## 2022-09-29 NOTE — CONSULTS
[facility]  Pulmonary, Critical Care, and Sleep Medicine    Initial Patient Referral report    Name: Ivan Brennan MRN: [id number]   : 1940 Hospital: [facility]   Date: 2022        IMPRESSION:   Acute on chronic shortness of breath-predominant component of decompensated heart failure secondary to patient not taking Lasix. Underlying history of asthmatic bronchitis-mild exacerbation improved. Symptoms suggestive of mucous retention  Chronic diastolic heart failure  REMY superimposed on chronic kidney disease stage IV  L popliteal nonocclusive DVT by duplex US 2022. 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 History of DVT/PE recurrent x3 since 2012-unprovoked per patient s/p IVC filter and on chronic anticoagulation with Eliquis  History of psoriasis on Humira  Type II DM      RECOMMENDATIONS:   Oxygen-wean to room air for SaO2 goal more than 92%. Bronchial hygiene protocol-we will increase dose of mucolytic's since patient feels congested and has difficulty expectorating mucus  Bronchodilators-streamline and switch to maintenance combination LABA plus ICS with continued DuoNebs as needed  Steroids-can be discontinued  Antibiotics-no definitive indication  Sinew Eliquis lifelong  Aspiration precautions  Continue to optimize treatment for CHF and fluid overload as well as cardiorenal syndrome  Out patient testing- PFT, 6 min walk, Polysomnogram  Assess home Oxygen needs at discharge  OT, PT, OOB and ambulate  Healthy weight  Will Follow  DVT, PUD prophylaxis     Subjective: This patient has been seen and evaluated at the request of Dr. Dilcia Medina for shortness of breath, history of PE.      Patient is a 80 y.o. female with history of hypertension, dyslipidemia, chronic diastolic CHF, asthmatic bronchitis, history of PE/DVT on Eliquis, current IVC filter in place, type 2 diabetes, psoriasis on Humira who presents to the [facility] with worsening shortness of breath. Apparently patient has had shortness of breath intermittently for the past few weeks. Seems to be more exertional shortness of breath. Denies any chest discomfort. She did have no extremity swelling. Apparently she was not taking her Lasix for the last 3 weeks. But she has not noticed any changes in her urine output or extremity swelling. She does see nephrology and her creatinine baseline has been in the 2.5 range recently. She has had CKD for the last several years. On presentation to the emergency room at [facility] she was found to have a creatinine of 4.13, BUN of 67, hyperkalemia, normal troponin. Chest x-ray showed mild pulmonary congestion. .  Duplex of legs showed nonocclusive DVT within the left popliteal vein. Patient has been compliant on Eliquis. She was started on heparin drip and was transferred to [facility] for evaluation. Patient states that she follows with a cardiologist but has never been evaluated by a pulmonologist.  She has been diagnosed with chronic asthmatic bronchitis and uses albuterol as needed  She is not on supplemental oxygen at home  She is a non-smoker  Her first episode of pulmonary embolism was in 2012 unprovoked. Subsequently she had 2 more episodes    I have reviewed all of the available data including the patient's previous history external records and radiological imaging available for review. In addition applicable cardiology and other lab data were also reviewed.     Past Medical History:   Diagnosis Date    Asthma     CAD (coronary artery disease)     Diabetes ([facility] [address]     Headache     HTN (hypertension)     Hypercholesteremia     Leg cramps     on potassium lasix    Pulmonary emboli (HCC)     UTI (urinary tract infection)       Past Surgical History:   Procedure Laterality Date    COLONOSCOPY N/A 6/5/2020    COLONOSCOPY WITH BXS performed by Scott Clarke MD at [address] [place] [address] RELEASE  04/22/2014    HX HYSTERECTOMY  1992    HX TUBAL LIGATION      laparoscopic    IR PLC IVC FILTER  6/10/2020      Prior to Admission medications    Medication Sig Start Date End Date Taking? Authorizing Provider   amLODIPine (NORVASC) 10 mg tablet Take 10 mg by mouth daily. 9/16/22  Yes Provider, Historical   hydrALAZINE (APRESOLINE) 50 mg tablet Take 50 mg by mouth three (3) times daily. 9/16/22  Yes Provider, Historical   isosorbide dinitrate (ISORDIL) 20 mg tablet Take 20 mg by mouth three (3) times daily. 9/16/22  Yes Provider, Historical   apixaban (ELIQUIS) 5 mg tablet Take 1 Tab by mouth two (2) times a day. 6/16/20  Yes Cindi Rivera MD   albuterol-ipratropium (DUO-NEB) 2.5 mg-0.5 mg/3 ml nebu 3 mL by Nebulization route every six (6) hours as needed for Wheezing. 6/16/20  Yes Cindi Rivera MD   ergocalciferol (ERGOCALCIFEROL) 50,000 unit capsule Vitamin D2 1,250 mcg (50,000 unit) capsule   Yes Provider, Historical   albuterol (PROVENTIL HFA, VENTOLIN HFA, PROAIR HFA) 90 mcg/actuation inhaler Take 1 Puff by inhalation every six (6) hours as needed. Yes Provider, Historical   rosuvastatin (CRESTOR) 10 mg tablet Take 10 mg by mouth nightly. Yes Provider, Historical   insulin glargine (LANTUS) 100 unit/mL injection by SubCUTAneous route nightly. 26 units in AM, 15 units in PM   Yes Provider, Historical   atenolol (TENORMIN) 25 mg tablet Take 25 mg by mouth daily. Yes Provider, Historical   furosemide (LASIX) 40 mg tablet Take 40 mg by mouth daily. Yes Provider, Historical   omeprazole (PRILOSEC) 20 mg capsule Take 20 mg by mouth daily. Yes Provider, Historical   HYDROcodone-acetaminophen (NORCO)  mg tablet Take 1 Tablet by mouth every eight (8) hours as needed. Yes Provider, Historical   lisinopril (PRINIVIL, ZESTRIL) 10 mg tablet Take  by mouth daily. Yes Provider, Historical   adalimumab (HUMIRA) 40 mg/0.8 mL injection pen 40 mg by SubCUTAneous route every seven (7) days. Yes Provider, Historical   tiotropium (SPIRIVA) 18 mcg inhalation capsule Take 1 Cap by inhalation daily. Yes Provider, Historical   FREESTYLE LITE STRIPS strip  11/30/19   Provider, Historical   FREESTYLE LANCETS 28 gauge misc  11/30/19   Provider, Historical   fluticasone propion-salmeteroL (ADVAIR/WIXELA) 100-50 mcg/dose diskus inhaler Take 1 Puff by inhalation every twelve (12) hours. Provider, Historical     No Known Allergies   Social History     Tobacco Use    Smoking status: Former    Smokeless tobacco: Never   Substance Use Topics    Alcohol use: Never      No family history on file. . Reviewed Family history- no pertinent findings of relvance    Current Facility-Administered Medications   Medication Dose Route Frequency    insulin glargine (LANTUS) injection 15 Units  15 Units SubCUTAneous Q12H    [Held by provider] furosemide (LASIX) injection 40 mg  40 mg IntraVENous BID    apixaban (ELIQUIS) tablet 5 mg  5 mg Oral Q12H    methylPREDNISolone (PF) (SOLU-MEDROL) injection 40 mg  40 mg IntraVENous Q8H    guaiFENesin ER (MUCINEX) tablet 600 mg  600 mg Oral Q12H    sodium chloride (NS) flush 5-40 mL  5-40 mL IntraVENous Q8H    insulin lispro (HUMALOG) injection   SubCUTAneous AC&HS    budesonide (PULMICORT) 500 mcg/2 ml nebulizer suspension  500 mcg Nebulization BID RT    And    arformoteroL (BROVANA) neb solution 15 mcg  15 mcg Nebulization BID RT    pantoprazole (PROTONIX) tablet 40 mg  40 mg Oral ACB    albuterol-ipratropium (DUO-NEB) 2.5 MG-0.5 MG/3 ML  3 mL Nebulization Q6HWA RT    atenoloL (TENORMIN) tablet 25 mg  25 mg Oral DAILY    hydrALAZINE (APRESOLINE) tablet 50 mg  50 mg Oral TID    isosorbide dinitrate (ISORDIL) tablet 20 mg  20 mg Oral TID    rosuvastatin (CRESTOR) tablet 10 mg  10 mg Oral QHS         REVIEW OF SYSTEMS:       [x]Total of 12 systems reviewed as follows:     GENERAL:no fever and chills   HEENT: no sinus congestion / hearing or vision changes  NECK: No pain or stiffness. PULMONARY: no shortness of breath and cough ,wheezing  Cardiovascular: denies palpitations or chest pain; no lower extremity edema  GASTROINTESTINAL: Denies abdominal pain, constipation, diarrhea, nausea, vomiting or melena / bloody stools  GENITOURINARY: No urinary frequency, urgency, hesitancy or dysuria. MUSCULOSKELETAL: no back / joint or muscle pain, no recent trauma. DERMATOLOGIC: denies pruritis, rashes or open areas   ENDOCRINE: No polyuria, polydipsia, no heat or cold intolerance. HEMATOLOGICAL: No anemia or easy bruising or bleeding. NEUROLOGIC:  no focal weakness,  no speech changes     Objective:   Vital Signs:    Visit Vitals  BP (!) 109/59 (BP 1 Location: Left lower arm, BP Patient Position: At rest)   Pulse 78   Temp 97 °F (36.1 °C)   Resp 16   Ht 5' 3\" (1.6 m)   Wt 78.9 kg (174 lb)   SpO2 95%   BMI 30.82 kg/m²       O2 Device: Nasal cannula   O2 Flow Rate (L/min): 2 l/min   Temp (24hrs), Av.8 °F (36.6 °C), Min:97 °F (36.1 °C), Max:98.9 °F (37.2 °C)       Intake/Output:   Last shift:      1901 -  0700  In: -   Out: 650 [Urine:650]  Last 3 shifts:  07 -  190  In: 840 [P.O.:840]  Out: 300 [Urine:300]    Intake/Output Summary (Last 24 hours) at 2022 0557  Last data filed at 2022 0345  Gross per 24 hour   Intake 840 ml   Output 950 ml   Net -110 ml      Physical Exam:   General:  Alert, cooperative, no distress, appears stated age. Head:  Normocephalic, without obvious abnormality, atraumatic. Eyes:  Conjunctivae/corneas clear. PERRL, EOMs intact. Nose: Nares normal. Septum midline. Mucosa normal. No drainage or sinus tenderness. Throat: Lips, mucosa, and tongue normal. Teeth and gums normal.   Neck: Supple, symmetrical, trachea midline, no adenopathy, thyroid: no enlargment/tenderness/nodules, no carotid bruit and no JVD. Back:   Symmetric, no curvature. ROM normal.   Lungs:   Clear to auscultation bilaterally.    Chest wall:  No tenderness or deformity. Heart:  Regular rate and rhythm, S1, S2 normal, no murmur, click, rub or gallop. Abdomen:   Soft, non-tender. Bowel sounds normal. No masses,  No organomegaly. Extremities: Extremities normal, atraumatic, no cyanosis or edema. Pulses: 2+ and symmetric all extremities.    Skin: Skin color, texture, turgor normal. No rashes or lesions   Lymph nodes: Cervical, supraclavicular, and axillary nodes normal.   Neurologic: Grossly nonfocal     Data review:     Recent Results (from the past 24 hour(s))   GLUCOSE, POC    Collection Time: 09/28/22  8:03 AM   Result Value Ref Range    Glucose (POC) 120 (H) 70 - 110 mg/dL   ECHO ADULT COMPLETE    Collection Time: 09/28/22 11:13 AM   Result Value Ref Range    Fractional Shortening 2D 30 28 - 44 %    LVIDd Index 2.42 cm/m2    LVIDs Index 1.70 cm/m2    LV RWT Ratio 0.50     LV Mass 2D 180.0 (A) 67 - 162 g    LV Mass 2D Index 98.9 (A) 43 - 95 g/m2    MV E/A 0.92     E/E' Ratio (Averaged) 15.03     E/E' Lateral 13.67     E/E' Septal 16.40     LA Volume Index A/L 33 16 - 34 mL/m2    LVOT Stroke Volume Index 33.3 mL/m2    LVOT Area 2.5 cm2    LA Volume Index 2C 24 16 - 34 mL/m2    LA Volume Index 4C 41 (A) 16 - 34 mL/m2    Ao Root Index 1.43 cm/m2    PASP 29 mmHg    IVSd 1.2 (A) 0.6 - 0.9 cm    LVIDd 4.4 3.9 - 5.3 cm    LVIDs 3.1 cm    LVOT Diameter 1.8 cm    LVPWd 1.1 (A) 0.6 - 0.9 cm    LVOT Peak Gradient 5 mmHg    LVOT Mean Gradient 2 mmHg    LVOT SV 60.5 ml    LVOT Peak Velocity 1.1 m/s    LVOT VTI 23.8 cm    RV Free Wall Peak S' 14 cm/s    LA Volume A/L 60 mL    LA Volume 2C 43 22 - 52 mL    LA Volume 4C 75 (A) 22 - 52 mL    MV A Velocity 0.89 m/s    MV E Wave Deceleration Time 231.1 ms    MV E Velocity 0.82 m/s    LV E' Lateral Velocity 6 cm/s    LV E' Septal Velocity 5 cm/s    TAPSE 2.6 1.7 cm    TR Peak Gradient 28 mmHg    TR Max Velocity 2.65 m/s    Aortic Root 2.6 cm   GLUCOSE, POC    Collection Time: 09/28/22 12:04 PM   Result Value Ref Range    Glucose (POC) 166 (H) 70 - 110 mg/dL   PTT    Collection Time: 09/28/22 12:58 PM   Result Value Ref Range    aPTT 148.9 (HH) 23.0 - 36.4 SEC   GLUCOSE, POC    Collection Time: 09/28/22  4:04 PM   Result Value Ref Range    Glucose (POC) 130 (H) 70 - 110 mg/dL   NT-PRO BNP    Collection Time: 09/28/22  9:27 PM   Result Value Ref Range    NT pro- 0 - 1,800 PG/ML   GLUCOSE, POC    Collection Time: 09/28/22 10:28 PM   Result Value Ref Range    Glucose (POC) 143 (H) 70 - 110 mg/dL   URINALYSIS W/ RFLX MICROSCOPIC    Collection Time: 09/28/22 11:57 PM   Result Value Ref Range    Color YELLOW      Appearance CLOUDY      Specific gravity 1.007 1.005 - 1.030      pH (UA) 5.0 5.0 - 8.0      Protein TRACE (A) NEG mg/dL    Glucose Negative NEG mg/dL    Ketone Negative NEG mg/dL    Bilirubin Negative NEG      Blood Negative NEG      Urobilinogen 0.2 0.2 - 1.0 EU/dL    Nitrites Negative NEG      Leukocyte Esterase LARGE (A) NEG     SODIUM, UR, RANDOM    Collection Time: 09/28/22 11:57 PM   Result Value Ref Range    Sodium,urine random 60 20 - 110 MMOL/L   CREATININE, UR, RANDOM    Collection Time: 09/28/22 11:57 PM   Result Value Ref Range    Creatinine, urine 40.00 30 - 125 mg/dL   PROTEIN URINE, RANDOM    Collection Time: 09/28/22 11:57 PM   Result Value Ref Range    Protein, urine random 21 (H) <11.9 mg/dL   URINE MICROSCOPIC ONLY    Collection Time: 09/28/22 11:57 PM   Result Value Ref Range    WBC >100 (H) 0 - 4 /hpf    RBC Negative 0 - 5 /hpf    Epithelial cells FEW 0 - 5 /lpf    Bacteria 1+ (A) NEG /hpf    Mucus FEW (A) NEG /lpf   MAGNESIUM    Collection Time: 09/29/22  2:43 AM   Result Value Ref Range    Magnesium 2.0 1.6 - 2.6 mg/dL   CBC W/O DIFF    Collection Time: 09/29/22  2:43 AM   Result Value Ref Range    WBC 6.1 4.6 - 13.2 K/uL    RBC 3.44 (L) 4.20 - 5.30 M/uL    HGB 10.4 (L) 12.0 - 16.0 g/dL    HCT 33.0 (L) 35.0 - 45.0 %    MCV 95.9 78.0 - 100.0 FL    MCH 30.2 24.0 - 34.0 PG    MCHC 31.5 31.0 - 37.0 g/dL    RDW 14.2 11.6 - 14.5 %    PLATELET 947 152 - 890 K/uL    MPV 9.4 9.2 - 11.8 FL    NRBC 0.0 0  WBC    ABSOLUTE NRBC 0.00 0.00 - 5.08 K/uL   METABOLIC PANEL, BASIC    Collection Time: 09/29/22  2:43 AM   Result Value Ref Range    Sodium 134 (L) 136 - 145 mmol/L    Potassium 4.9 3.5 - 5.5 mmol/L    Chloride 104 100 - 111 mmol/L    CO2 25 21 - 32 mmol/L    Anion gap 5 3.0 - 18 mmol/L    Glucose 137 (H) 74 - 99 mg/dL    BUN 69 (H) 7.0 - 18 MG/DL    Creatinine 2.55 (H) 0.6 - 1.3 MG/DL    BUN/Creatinine ratio 27 (H) 12 - 20      GFR est AA 22 (L) >60 ml/min/1.73m2    GFR est non-AA 18 (L) >60 ml/min/1.73m2    Calcium 8.9 8.5 - 10.1 MG/DL   PHOSPHORUS    Collection Time: 09/29/22  2:43 AM   Result Value Ref Range    Phosphorus 3.9 2.5 - 4.9 MG/DL       Imaging:  I have personally reviewed the patients radiographs and have reviewed the reports:  XR Results (most recent):  Results from Hospital Encounter encounter on 06/02/20    XR CHEST PA LAT    Narrative  Indication: shortness of breath. Impression  Impression:    Congestive heart failure. Chronic obstructive lung disease. Comment:    AP and lateral views of the chest were obtained. The heart is enlarged. There is prominence of the pulmonary interstitium and  small bilateral pleural effusions. This is compatible with congestive heart  failure. Underlying chronic lung disease is suspected with hyperaeration of the  lungs from chronic obstructive disease. Lower anterior fusion hardware is in  place. CT Results (most recent):  Results from Abstract encounter on 03/26/19    CT ABD PELV WO CONT    Impression  CT ABDOMEN PELVIS W/O IV CONTRAST (03/14/2019 2:35 PM EDT)  Impressions Performed At  IMPRESSION:  1. Copious stool suggests possible constipation; correlate with bowel  habits. 2. Large hiatus hernia. 3. Limited evaluation without IV contrast precludes definitive evaluation  of solid organs.         Reading Doctor: Luis A Zuniga Signature by: Basilia Pavon   [address] [facility]      CT ABDOMEN PELVIS W/O IV CONTRAST (03/14/2019 2:35 PM EDT)  Narrative Performed At  History: Abdominal pain. TECHNIQUE: Helical noncontrast 7.17 mm images are obtained through the  abdomen and pelvis. Oral contrast administered. Multiplanar reformat images  are generated. Findings: There is a large hiatus hernia. The lung bases are clear. There  is a nonobstructive bowel gas pattern. Copious stool suggests possible constipation; correlate with bowel habits. No free fluid or free air. No adenopathy. There is calcific aortoiliac  atherosclerosis. There is an indeterminate hypodense lesion in the left  kidney consistent with cyst unchanged since 9 May 2016. Without IV  contrast, definitive evaluation of solid organs is not achieved. The  appendix is normal. Bone window images reveal no significant focal  osteolytic or osteoblastic bone abnormalities. Age related degenerative  changes noted in the spine. [address] [facility]    CT ABDOMEN PELVIS W/O IV CONTRAST (03/14/2019 2:35 PM EDT)  Procedure Note  Interface, Radresults_Incoming - 03/14/2019 2:47 PM EDT  History: Abdominal pain. TECHNIQUE: Helical noncontrast 4.25 mm images are obtained through the  abdomen and pelvis. Oral contrast administered. Multiplanar reformat images  are generated. Findings: There is a large hiatus hernia. The lung bases are clear. There  is a nonobstructive bowel gas pattern. Copious stool suggests possible constipation; correlate with bowel habits. No free fluid or free air. No adenopathy. There is calcific aortoiliac  atherosclerosis. There is an indeterminate hypodense lesion in the left  kidney consistent with cyst unchanged since 9 May 2016. Without IV  contrast, definitive evaluation of solid organs is not achieved. The  appendix is normal. Bone window images reveal no significant focal  osteolytic or osteoblastic bone abnormalities.  Age related degenerative  changes noted in the spine. IMPRESSION  IMPRESSION:  1. Copious stool suggests possible constipation; correlate with bowel  habits. 2. Large hiatus hernia. 3. Limited evaluation without IV contrast precludes definitive evaluation  of solid organs. Reading Doctor: Fatimah Kurtz Signature by: Tiffanie Mack    CT ABDOMEN PELVIS W/O IV CONTRAST (03/14/2019 2:35 PM EDT)  Performing Organization Address City/State/Zipcode Phone Number  [facility]   [address], [address] [facility] [phone]    ~~This report was copied and pasted from the [employer] system. ~~         09/27/22    ECHO ADULT COMPLETE 09/28/2022 9/28/2022    Interpretation Summary    Left Ventricle: Normal left ventricular systolic function with a visually estimated EF of 60 - 65%. Left ventricle size is normal. Mildly increased wall thickness. Normal wall motion. Diastolic dysfunction present with normal LV EF. Aortic Valve: Tricuspid valve. Mild sclerosis of the aortic valve cusp. Mitral Valve: Mildly thickened leaflet. Mild annular calcification at the anterior and posterior leaflets of the mitral valve. Pulmonary Arteries: Pulmonary hypertension not present. The estimated PASP is 29 mmHg. Signed by: Yogi Cha MD on 9/28/2022 11:40 AM      Complex decision making was made in the evaluation and management plans during this consultation. More than 50% of time was spent in counseling and coordination of care including review of data and discussion with other team members. Serena Frausto MD  Pulmonary & Critical Care      Please note that this dictation was completed with [employer], the computer voice recognition software. Quite often unanticipated grammatical, syntax, homophones, and other interpretive errors are inadvertently transcribed by the computer software. Please disregard these errors. Please excuse any errors that have escaped final proofreading.

## 2022-09-29 NOTE — PROGRESS NOTES
New OT order received and chart reviewed. Patient evaluated and discharged from skilled OT caseload on 9/28/2022. Please see full note for details. Will acknowledge and complete the order.   Thank you for the referral.  Florentino Sim MS OTR/L

## 2022-09-30 LAB
ANION GAP SERPL CALC-SCNC: 8 MMOL/L (ref 3–18)
APTT PPP: 33.4 SEC (ref 23–36.4)
BUN SERPL-MCNC: 70 MG/DL (ref 7–18)
BUN/CREAT SERPL: 27 (ref 12–20)
CALCIUM SERPL-MCNC: 8.9 MG/DL (ref 8.5–10.1)
CHLORIDE SERPL-SCNC: 104 MMOL/L (ref 100–111)
CO2 SERPL-SCNC: 24 MMOL/L (ref 21–32)
CREAT SERPL-MCNC: 2.55 MG/DL (ref 0.6–1.3)
ERYTHROCYTE [DISTWIDTH] IN BLOOD BY AUTOMATED COUNT: 13.9 % (ref 11.6–14.5)
GLUCOSE BLD STRIP.AUTO-MCNC: 100 MG/DL (ref 70–110)
GLUCOSE BLD STRIP.AUTO-MCNC: 117 MG/DL (ref 70–110)
GLUCOSE BLD STRIP.AUTO-MCNC: 124 MG/DL (ref 70–110)
GLUCOSE BLD STRIP.AUTO-MCNC: 167 MG/DL (ref 70–110)
GLUCOSE SERPL-MCNC: 139 MG/DL (ref 74–99)
HCT VFR BLD AUTO: 31.9 % (ref 35–45)
HGB BLD-MCNC: 10.2 G/DL (ref 12–16)
MAGNESIUM SERPL-MCNC: 2.2 MG/DL (ref 1.6–2.6)
MCH RBC QN AUTO: 30.4 PG (ref 24–34)
MCHC RBC AUTO-ENTMCNC: 32 G/DL (ref 31–37)
MCV RBC AUTO: 94.9 FL (ref 78–100)
NRBC # BLD: 0 K/UL (ref 0–0.01)
NRBC BLD-RTO: 0 PER 100 WBC
PLATELET # BLD AUTO: 209 K/UL (ref 135–420)
PMV BLD AUTO: 9.8 FL (ref 9.2–11.8)
POTASSIUM SERPL-SCNC: 4.8 MMOL/L (ref 3.5–5.5)
RBC # BLD AUTO: 3.36 M/UL (ref 4.2–5.3)
SODIUM SERPL-SCNC: 136 MMOL/L (ref 136–145)
WBC # BLD AUTO: 6.4 K/UL (ref 4.6–13.2)

## 2022-09-30 PROCEDURE — 74011636637 HC RX REV CODE- 636/637: Performed by: HOSPITALIST

## 2022-09-30 PROCEDURE — 77010033678 HC OXYGEN DAILY

## 2022-09-30 PROCEDURE — 74011250636 HC RX REV CODE- 250/636: Performed by: HOSPITALIST

## 2022-09-30 PROCEDURE — 65270000046 HC RM TELEMETRY

## 2022-09-30 PROCEDURE — 74011250637 HC RX REV CODE- 250/637: Performed by: INTERNAL MEDICINE

## 2022-09-30 PROCEDURE — 74011000250 HC RX REV CODE- 250: Performed by: INTERNAL MEDICINE

## 2022-09-30 PROCEDURE — 2709999900 HC NON-CHARGEABLE SUPPLY

## 2022-09-30 PROCEDURE — 83735 ASSAY OF MAGNESIUM: CPT

## 2022-09-30 PROCEDURE — 94762 N-INVAS EAR/PLS OXIMTRY CONT: CPT

## 2022-09-30 PROCEDURE — 80048 BASIC METABOLIC PNL TOTAL CA: CPT

## 2022-09-30 PROCEDURE — 99233 SBSQ HOSP IP/OBS HIGH 50: CPT | Performed by: INTERNAL MEDICINE

## 2022-09-30 PROCEDURE — 94640 AIRWAY INHALATION TREATMENT: CPT

## 2022-09-30 PROCEDURE — 82962 GLUCOSE BLOOD TEST: CPT

## 2022-09-30 PROCEDURE — 74011000250 HC RX REV CODE- 250: Performed by: HOSPITALIST

## 2022-09-30 PROCEDURE — 85730 THROMBOPLASTIN TIME PARTIAL: CPT

## 2022-09-30 PROCEDURE — 36415 COLL VENOUS BLD VENIPUNCTURE: CPT

## 2022-09-30 PROCEDURE — 74011250637 HC RX REV CODE- 250/637: Performed by: HOSPITALIST

## 2022-09-30 PROCEDURE — 85027 COMPLETE CBC AUTOMATED: CPT

## 2022-09-30 RX ORDER — FUROSEMIDE 40 MG/1
40 TABLET ORAL 2 TIMES DAILY
Status: DISCONTINUED | OUTPATIENT
Start: 2022-09-30 | End: 2022-10-03 | Stop reason: HOSPADM

## 2022-09-30 RX ADMIN — APIXABAN 5 MG: 5 TABLET, FILM COATED ORAL at 21:40

## 2022-09-30 RX ADMIN — CEFTRIAXONE 1 G: 1 INJECTION, POWDER, FOR SOLUTION INTRAMUSCULAR; INTRAVENOUS at 16:30

## 2022-09-30 RX ADMIN — IPRATROPIUM BROMIDE AND ALBUTEROL SULFATE 3 ML: .5; 3 SOLUTION RESPIRATORY (INHALATION) at 21:37

## 2022-09-30 RX ADMIN — BUDESONIDE 250 MCG: 0.25 SUSPENSION RESPIRATORY (INHALATION) at 21:37

## 2022-09-30 RX ADMIN — Medication 3 UNITS: at 12:17

## 2022-09-30 RX ADMIN — Medication 15 UNITS: at 09:10

## 2022-09-30 RX ADMIN — GUAIFENESIN 1200 MG: 600 TABLET, EXTENDED RELEASE ORAL at 21:40

## 2022-09-30 RX ADMIN — BUDESONIDE 250 MCG: 0.25 SUSPENSION RESPIRATORY (INHALATION) at 08:26

## 2022-09-30 RX ADMIN — PANTOPRAZOLE SODIUM 40 MG: 40 TABLET, DELAYED RELEASE ORAL at 06:48

## 2022-09-30 RX ADMIN — ISOSORBIDE DINITRATE 20 MG: 10 TABLET ORAL at 16:30

## 2022-09-30 RX ADMIN — IPRATROPIUM BROMIDE AND ALBUTEROL SULFATE 3 ML: .5; 3 SOLUTION RESPIRATORY (INHALATION) at 15:04

## 2022-09-30 RX ADMIN — HYDRALAZINE HYDROCHLORIDE 50 MG: 50 TABLET, FILM COATED ORAL at 21:40

## 2022-09-30 RX ADMIN — ISOSORBIDE DINITRATE 20 MG: 10 TABLET ORAL at 21:41

## 2022-09-30 RX ADMIN — HYDRALAZINE HYDROCHLORIDE 50 MG: 50 TABLET, FILM COATED ORAL at 09:09

## 2022-09-30 RX ADMIN — HYDRALAZINE HYDROCHLORIDE 50 MG: 50 TABLET, FILM COATED ORAL at 16:30

## 2022-09-30 RX ADMIN — ROSUVASTATIN CALCIUM 10 MG: 10 TABLET, FILM COATED ORAL at 21:41

## 2022-09-30 RX ADMIN — ISOSORBIDE DINITRATE 20 MG: 10 TABLET ORAL at 09:09

## 2022-09-30 RX ADMIN — APIXABAN 5 MG: 5 TABLET, FILM COATED ORAL at 09:09

## 2022-09-30 RX ADMIN — FUROSEMIDE 40 MG: 40 TABLET ORAL at 09:09

## 2022-09-30 RX ADMIN — IPRATROPIUM BROMIDE AND ALBUTEROL SULFATE 3 ML: .5; 3 SOLUTION RESPIRATORY (INHALATION) at 08:26

## 2022-09-30 RX ADMIN — GUAIFENESIN 1200 MG: 600 TABLET, EXTENDED RELEASE ORAL at 09:09

## 2022-09-30 RX ADMIN — ARFORMOTEROL TARTRATE 15 MCG: 15 SOLUTION RESPIRATORY (INHALATION) at 08:26

## 2022-09-30 RX ADMIN — Medication 15 UNITS: at 23:27

## 2022-09-30 RX ADMIN — ATENOLOL 25 MG: 25 TABLET ORAL at 09:09

## 2022-09-30 RX ADMIN — SODIUM CHLORIDE, PRESERVATIVE FREE 10 ML: 5 INJECTION INTRAVENOUS at 21:41

## 2022-09-30 RX ADMIN — ARFORMOTEROL TARTRATE 15 MCG: 15 SOLUTION RESPIRATORY (INHALATION) at 21:38

## 2022-09-30 RX ADMIN — SODIUM CHLORIDE, PRESERVATIVE FREE 10 ML: 5 INJECTION INTRAVENOUS at 16:31

## 2022-09-30 RX ADMIN — FUROSEMIDE 40 MG: 40 TABLET ORAL at 17:15

## 2022-09-30 NOTE — PROGRESS NOTES
Walk Test    Step 1)      Oxygen saturation at ______95_______% on room air at rest.     If less than 88%: STOP! No further documentation needed; Otherwise proceeded to step 2 and 3        Step 2)      Oxygen saturation at  ______82_______% on room air with exertion      while walking for 6 minutes. Oxygen added to patient.         3)_______99_______% Rest with O2     Step 4)      Oxygen saturation at ________95_____% while walking on Oxygen at _____2____LPM      Via:      [x] Nasal Cannula   [] Simple Face Mask   [] Non-Re-Breather Mask  [] Partial Re-Breather Mask   [] Venturi Mask

## 2022-09-30 NOTE — PROGRESS NOTES
Problem: Falls - Risk of  Goal: *Absence of Falls  Description: Document Negrito Counts Fall Risk and appropriate interventions in the flowsheet. Outcome: Progressing Towards Goal  Note: Fall Risk Interventions:  Mobility Interventions: Assess mobility with egress test    Mentation Interventions: Adequate sleep, hydration, pain control, Door open when patient unattended    Medication Interventions: Teach patient to arise slowly    Elimination Interventions: Call light in reach, Toilet paper/wipes in reach    History of Falls Interventions: Door open when patient unattended, Room close to nurse's station     Problem: Pressure Injury - Risk of  Goal: *Prevention of pressure injury  Description: Document David Scale and appropriate interventions in the flowsheet.   Outcome: Progressing Towards Goal  Note: Pressure Injury Interventions:    Moisture Interventions: Absorbent underpads    Activity Interventions: Pressure redistribution bed/mattress(bed type)    Mobility Interventions: HOB 30 degrees or less, Pressure redistribution bed/mattress (bed type)    Nutrition Interventions: Document food/fluid/supplement intake

## 2022-09-30 NOTE — PROGRESS NOTES
RENAL PROGRESS NOTE        Teressa Montoya         Assessment/Plan:     REMY (in a setting of the ADHFpEF). Improving to the baseline. 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 ADHFrEF/volume overload. Lasix restarted, increase to 40 mg twice a day (wasn't mistakenly taking lasix pta). HTN, bp is controlled/slightly low. Continue to hold amlodipine and ace I.   COPD, contributes to chronic dyspnea. UTI, on abx. Will follow up on Monday, please call if any questions. If d/grey, follow up with nephrology at [place], [place]. She is an established patient there. Subjective:  Patient complaints off: Feels better but is still sob on exertion. No CP/N/V. Appetite is fair.        Patient Active Problem List   Diagnosis Code    Severe obesity ([facility] E66.01    Lower GI bleed K92.2    Acute CHF (congestive heart failure) ([facility] I50.9    REMY (acute kidney injury) ([facility] N17.9       Current Facility-Administered Medications   Medication Dose Route Frequency Provider Last Rate Last Admin    guaiFENesin ER (MUCINEX) tablet 1,200 mg  1,200 mg Oral Q12H Cyndie Pickett MD   1,200 mg at 09/30/22 0909    budesonide (PULMICORT) 250 mcg/2ml nebulizer susp  250 mcg Nebulization BID RT Maikel Jimenez MD   250 mcg at 09/30/22 4672    And    arformoteroL (BROVANA) neb solution 15 mcg  15 mcg Nebulization BID RT Maikel Jimenez MD   15 mcg at 09/30/22 0826    insulin glargine (LANTUS) injection 15 Units  15 Units SubCUTAneous DAILY Vale Lerma MD   15 Units at 09/30/22 0910    insulin glargine (LANTUS) injection 20 Units  20 Units SubCUTAneous QHS Vale Lerma MD   20 Units at 09/29/22 2122    cefTRIAXone (ROCEPHIN) 1 g in sterile water (preservative free) 10 mL IV syringe  1 g IntraVENous Q24H Vale Lerma MD   1 g at 09/29/22 1648    furosemide (LASIX) tablet 40 mg  40 mg Oral DAILY Vincent Calle MD JANE   40 mg at 09/30/22 0909    apixaban (ELIQUIS) tablet 5 mg  5 mg Oral Q12H Min Espinal MD   5 mg at 09/30/22 0909    sodium chloride (NS) flush 5-40 mL  5-40 mL IntraVENous Q8H Nicholette Berry Scott MD   10 mL at 09/29/22 1427    sodium chloride (NS) flush 5-40 mL  5-40 mL IntraVENous PRN Kaylan Wayne MD        acetaminophen (TYLENOL) tablet 650 mg  650 mg Oral Q6H PRN Kaylan Wayne MD        Or    acetaminophen (TYLENOL) suppository 650 mg  650 mg Rectal Q6H PRN Kaylan Wayne MD        polyethylene glycol (MIRALAX) packet 17 g  17 g Oral DAILY PRN Kaylan Wayne MD        promethazine (PHENERGAN) tablet 12.5 mg  12.5 mg Oral Q6H PRN Kaylan Wayne MD        Or    ondansetron [facility]) injection 4 mg  4 mg IntraVENous Q6H PRN Kaylan Wayne MD        insulin lispro (HUMALOG) injection   SubCUTAneous AC&HS Min Espinal MD   2 Units at 09/29/22 2117    glucose chewable tablet 16 g  4 Tablet Oral PRN Kaylan Wayne MD        glucagon (GLUCAGEN) injection 1 mg  1 mg IntraMUSCular PRN Kaylan Wayne MD        pantoprazole (PROTONIX) tablet 40 mg  40 mg Oral ACB Kaylan Wayne MD   40 mg at 09/30/22 7027    albuterol-ipratropium (DUO-NEB) 2.5 MG-0.5 MG/3 ML  3 mL Nebulization Q6HWA RT Kaylan Wayne MD   3 mL at 09/30/22 0826    atenoloL (TENORMIN) tablet 25 mg  25 mg Oral DAILY Kaylan Wayne MD   25 mg at 09/30/22 8186    hydrALAZINE (APRESOLINE) tablet 50 mg  50 mg Oral TID Kaylan Wayne MD   50 mg at 09/30/22 9919    isosorbide dinitrate (ISORDIL) tablet 20 mg  20 mg Oral TID Kaylan Wayne MD   20 mg at 09/30/22 3595    rosuvastatin (CRESTOR) tablet 10 mg  10 mg Oral QHS Kaylan Wayne MD   10 mg at 09/29/22 2123       Objective  Vitals:    09/29/22 2338 09/30/22 0355 09/30/22 0815 09/30/22 0828   BP: (!) 108/53 130/62 134/72    Pulse: 77 71 66    Resp: 22 20 20    Temp: 97 °F (36.1 °C) 97.2 °F (36.2 °C) 97.6 °F (36.4 °C)    SpO2: 98% 98% 96% 99%   Weight:       Height:             Intake/Output Summary (Last 24 hours) at 9/30/2022 [address] [place] [address] filed at 9/30/2022 0919  Gross per 24 hour   Intake 1200 ml   Output 1750 ml   Net -550 ml           Admission weight: Weight: 79.3 kg (174 lb 13.2 oz) (09/27/22 2017)  Last Weight Metrics:  Weight Loss Metrics 9/28/2022 9/27/2022 6/12/2020 12/11/2019 10/16/2019 4/19/2019 3/28/2019   Today's Wt 174 lb - 188 lb 7.9 oz 186 lb - 186 lb 186 lb 12.8 oz   BMI - 30.82 kg/m2 34.48 kg/m2 35.14 kg/m2 35.14 kg/m2 35.14 kg/m2 35.3 kg/m2             Physical Assessment:     General: NAD, alert and oriented. Neck: No jvd. LUNGS: diffusely diminished air entry, bl exp wheezes. CVS EXM: S1, S2  RRR. Abdomen: soft, non tender. Lower Extremities:  1+ non pitting edema. Lab    CBC w/Diff Recent Labs     09/30/22 0329 09/29/22 0243 09/28/22  0434 09/27/22  1908   WBC 6.4 6.1 6.2 7.4   RBC 3.36* 3.44* 3.72* 4.39   HGB 10.2* 10.4* 11.3* 13.1   HCT 31.9* 33.0* 35.5 41.8    206 214 247   GRANS  --   --  58 59   LYMPH  --   --  21 21   EOS  --   --  3 3        Chemistry Recent Labs     09/30/22 0329 09/29/22 0243 09/28/22  0434   * 137* 132*    134* 137   K 4.8 4.9 5.0    104 106   CO2 24 25 26   BUN 70* 69* 68*   CREA 2.55* 2.55* 2.69*   CA 8.9 8.9 8.9   AGAP 8 5 5   BUCR 27* 27* 25*   PHOS  --  3.9  --          Lab Results   Component Value Date/Time    Iron 24 (L) 06/03/2020 11:38 AM    TIBC 178 (L) 06/03/2020 11:38 AM    Ferritin 400.1 (H) 06/03/2020 11:38 AM      Lab Results   Component Value Date/Time    Calcium 8.9 09/30/2022 03:29 AM    Phosphorus 3.9 09/29/2022 02:43 AM        Aurora Bhandari M.D.   [facility]  Phone

## 2022-09-30 NOTE — PROGRESS NOTES
[facility]  Progress Note    Patient: Brittaney Dean Age: 80 y.o. : 1940 MR#: [id number] SSN: [id number]  Date/Time: 2022    Subjective:     Patient states she feels much better. Breathing better. Urinary symptoms improving. Expresses understanding she qualified for home oxygen. Urine culture in process. Assessment/Plan:     1. Dyspnea on exertion multifactorial, improving. 2. Cardiac catheterization 2022 with normal epicardial coronary arteries, normal LVEF 60% with normal wall motion. Nuclear stress test 2022 with moderate to large area of reversible ischemia extending from the septal wall across the anterior apical wall to the lateral wall. 3. L popliteal nonocclusive DVT by duplex US 2022. 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 4.  REMY on CKD 3. Nephrology follows. renal ultrasound no hydronephrosis. 5.  Acute respiratory failure with hypoxia, resting O2 sat 80%. Patient qualified for home oxygen, ordered. If she goes home , will need retesting. 6. DM2 with steroid-induced hyperglycemia. .  Lantus increased, sliding scale insulin, ADA diet. 7. dyslipidemia  8. Hx recurrent UTI's  9. Former tobacco.  10. Obesity Body mass index is 30.82 kg/m². 11. Hx of ischemic colitis . 12.  hx syncope with negative tilt table 2022  13. UTI. ceftriaxone. Culture in process. 14. DVT prophylaxis  15. Full code. Discharge to home with home health and oxygen when ready.     Additional Notes:      Case discussed with:  [x]Patient  []Family  [x]Nursing  [x]Case Management  DVT Prophylaxis:  []Lovenox  []Hep SQ  []SCDs  []Coumadin   []On Heparin gtt  [x] noac    Objective:   VS: Visit Vitals  /72 (BP 1 Location: Right lower arm, BP Patient Position: At rest)   Pulse 66   Temp 97.6 °F (36.4 °C)   Resp 20   Ht 5' 3\" (1.6 m)   Wt 78.9 kg (174 lb)   SpO2 99%   BMI 30.82 kg/m²        Tmax/24hrs: Temp (24hrs), Av.3 °F (36.3 °C), Min:97 °F (36.1 °C), Max:97.6 °F (36.4 °C)    Input/Output:   Intake/Output Summary (Last 24 hours) at 2022 09  Last data filed at 2022 0919  Gross per 24 hour   Intake 1200 ml   Output 1750 ml   Net -550 ml       General: Awake alert and oriented x4. Appears several years younger than her stated age. Speaking in full sentences on supplemental oxygen. HEENT normocephalic atraumatic pupils equally round reactive to light. Oropharynx clear  Cardiovascular: Regular rate and rhythm. Pulmonary: Clear to auscultation bilaterally. No rhonchi. No wheeze.   GI: Soft nontender nondistended no  Extremities: Nonpitting edema bilaterally  Neuro no focal deficit  Additional: No rash to visible skin    Labs:    Recent Results (from the past 24 hour(s))   GLUCOSE, POC    Collection Time: 22 11:07 AM   Result Value Ref Range    Glucose (POC) 325 (H) 70 - 110 mg/dL   GLUCOSE, POC    Collection Time: 22  4:16 PM   Result Value Ref Range    Glucose (POC) 109 70 - 110 mg/dL   GLUCOSE, POC    Collection Time: 22  9:16 PM   Result Value Ref Range    Glucose (POC) 163 (H) 70 - 110 mg/dL   MAGNESIUM    Collection Time: 22  3:29 AM   Result Value Ref Range    Magnesium 2.2 1.6 - 2.6 mg/dL   CBC W/O DIFF    Collection Time: 22  3:29 AM   Result Value Ref Range    WBC 6.4 4.6 - 13.2 K/uL    RBC 3.36 (L) 4.20 - 5.30 M/uL    HGB 10.2 (L) 12.0 - 16.0 g/dL    HCT 31.9 (L) 35.0 - 45.0 %    MCV 94.9 78.0 - 100.0 FL    MCH 30.4 24.0 - 34.0 PG    MCHC 32.0 31.0 - 37.0 g/dL    RDW 13.9 11.6 - 14.5 %    PLATELET 067 232 - 069 K/uL    MPV 9.8 9.2 - 11.8 FL    NRBC 0.0 0  WBC    ABSOLUTE NRBC 0.00 0.00 - 6.41 K/uL   METABOLIC PANEL, BASIC    Collection Time: 22  3:29 AM   Result Value Ref Range    Sodium 136 136 - 145 mmol/L    Potassium 4.8 3.5 - 5.5 mmol/L    Chloride 104 100 - 111 mmol/L    CO2 24 21 - 32 mmol/L    Anion gap 8 3.0 - 18 mmol/L    Glucose 139 (H) 74 - 99 mg/dL    BUN 70 (H) 7.0 - 18 MG/DL    Creatinine 2.55 (H) 0.6 - 1.3 MG/DL    BUN/Creatinine ratio 27 (H) 12 - 20      GFR est AA 22 (L) >60 ml/min/1.73m2    GFR est non-AA 18 (L) >60 ml/min/1.73m2    Calcium 8.9 8.5 - 10.1 MG/DL   PTT    Collection Time: 09/30/22  3:29 AM   Result Value Ref Range    aPTT 33.4 23.0 - 36.4 SEC   GLUCOSE, POC    Collection Time: 09/30/22  8:09 AM   Result Value Ref Range    Glucose (POC) 124 (H) 70 - 110 mg/dL     Additional Data Reviewed:      Signed By: Rosalva Goncalves MD     September 30, 2022 10:30 AM

## 2022-09-30 NOTE — PROGRESS NOTES
Discharge planning    Received call [facility] with Jasson Geronimo concerning oxygen. Per [facility],  patient will need new walk test if discharge on Sunday but walk test still good for Saturday discharge. If discharged on Sunday, faxed updated walk test and clinicals to [phone]. Updated Dr. Pura Hernandez via KATRINA TarangoN, RN  Pager # [phone]  Care Manager

## 2022-09-30 NOTE — CONSULTS
[facility]  Pulmonary, Critical Care, and Sleep Medicine    Initial Patient Referral report    Name: Mariam Manning MRN: [id number]   : 1940 Hospital: [address] [place] [address]    Date: 2022        IMPRESSION:   Acute on chronic shortness of breath-predominant component of decompensated heart failure secondary to patient not taking Lasix. Underlying history of asthmatic bronchitis-mild exacerbation improved. Symptoms suggestive of mucous retention  Chronic diastolic heart failure  REMY superimposed on chronic kidney disease stage IV  L popliteal nonocclusive DVT by duplex US 2022. 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 History of DVT/PE recurrent x3 since -unprovoked per patient s/p IVC filter and on chronic anticoagulation with Eliquis  History of psoriasis on Humira  Type II DM      RECOMMENDATIONS:   Oxygen-wean to room air for SaO2 goal more than 92%. Bronchial hygiene protocol-we will increase dose of mucolytic's since patient feels congested and has difficulty expectorating mucus  Bronchodilators-streamline and switch to maintenance combination LABA plus ICS with continued DuoNebs as needed  Steroids-can be discontinued  Antibiotics-no definitive indication  Sinew Eliquis lifelong  Aspiration precautions  Continue to optimize treatment for CHF and fluid overload as well as cardiorenal syndrome  Assess home Oxygen needs at discharge    Will sign off    Follow up with outside pulmonologist in the [facility]. Subjective:       Pt overall doing better with diuresis. On 2L nc. No leg swelling. This patient has been seen and evaluated at the request of Dr. Conchis Humphries for shortness of breath, history of PE.      Patient is a 80 y.o. female with history of hypertension, dyslipidemia, chronic diastolic CHF, asthmatic bronchitis, history of PE/DVT on Eliquis, current IVC filter in place, type 2 diabetes, psoriasis on Humira who presents to the [facility] with worsening shortness of breath. Apparently patient has had shortness of breath intermittently for the past few weeks. Seems to be more exertional shortness of breath. Denies any chest discomfort. She did have no extremity swelling. Apparently she was not taking her Lasix for the last 3 weeks. But she has not noticed any changes in her urine output or extremity swelling. She does see nephrology and her creatinine baseline has been in the 2.5 range recently. She has had CKD for the last several years. On presentation to the emergency room at [facility] she was found to have a creatinine of 4.13, BUN of 67, hyperkalemia, normal troponin. Chest x-ray showed mild pulmonary congestion. .  Duplex of legs showed nonocclusive DVT within the left popliteal vein. Patient has been compliant on Eliquis. She was started on heparin drip and was transferred to [facility] for evaluation. Patient states that she follows with a cardiologist but has never been evaluated by a pulmonologist.  She has been diagnosed with chronic asthmatic bronchitis and uses albuterol as needed  She is not on supplemental oxygen at home  She is a non-smoker  Her first episode of pulmonary embolism was in 2012 unprovoked. Subsequently she had 2 more episodes    I have reviewed all of the available data including the patient's previous history external records and radiological imaging available for review. In addition applicable cardiology and other lab data were also reviewed.     Past Medical History:   Diagnosis Date    Asthma     CAD (coronary artery disease)     Diabetes ([facility] [address]     Headache     HTN (hypertension)     Hypercholesteremia     Leg cramps     on potassium lasix    Pulmonary emboli (HCC)     UTI (urinary tract infection)       Past Surgical History:   Procedure Laterality Date    COLONOSCOPY N/A 6/5/2020    COLONOSCOPY WITH BXS performed by Arash Rodríguez MD at [address] [place] [address] HX CARPAL TUNNEL RELEASE  04/22/2014    HX HYSTERECTOMY  1992    HX TUBAL LIGATION      laparoscopic    IR PLC IVC FILTER  6/10/2020      Prior to Admission medications    Medication Sig Start Date End Date Taking? Authorizing Provider   amLODIPine (NORVASC) 10 mg tablet Take 10 mg by mouth daily. 9/16/22  Yes Provider, Historical   hydrALAZINE (APRESOLINE) 50 mg tablet Take 50 mg by mouth three (3) times daily. 9/16/22  Yes Provider, Historical   isosorbide dinitrate (ISORDIL) 20 mg tablet Take 20 mg by mouth three (3) times daily. 9/16/22  Yes Provider, Historical   apixaban (ELIQUIS) 5 mg tablet Take 1 Tab by mouth two (2) times a day. 6/16/20  Yes Shanta Cano MD   albuterol-ipratropium (DUO-NEB) 2.5 mg-0.5 mg/3 ml nebu 3 mL by Nebulization route every six (6) hours as needed for Wheezing. 6/16/20  Yes Shanta Cano MD   ergocalciferol (ERGOCALCIFEROL) 50,000 unit capsule Vitamin D2 1,250 mcg (50,000 unit) capsule   Yes Provider, Historical   albuterol (PROVENTIL HFA, VENTOLIN HFA, PROAIR HFA) 90 mcg/actuation inhaler Take 1 Puff by inhalation every six (6) hours as needed. Yes Provider, Historical   rosuvastatin (CRESTOR) 10 mg tablet Take 10 mg by mouth nightly. Yes Provider, Historical   insulin glargine (LANTUS) 100 unit/mL injection by SubCUTAneous route nightly. 26 units in AM, 15 units in PM   Yes Provider, Historical   atenolol (TENORMIN) 25 mg tablet Take 25 mg by mouth daily. Yes Provider, Historical   furosemide (LASIX) 40 mg tablet Take 40 mg by mouth daily. Yes Provider, Historical   omeprazole (PRILOSEC) 20 mg capsule Take 20 mg by mouth daily. Yes Provider, Historical   HYDROcodone-acetaminophen (NORCO)  mg tablet Take 1 Tablet by mouth every eight (8) hours as needed. Yes Provider, Historical   lisinopril (PRINIVIL, ZESTRIL) 10 mg tablet Take  by mouth daily.    Yes Provider, Historical   adalimumab (HUMIRA) 40 mg/0.8 mL injection pen 40 mg by SubCUTAneous route every seven (7) days. Yes Provider, Historical   tiotropium (SPIRIVA) 18 mcg inhalation capsule Take 1 Cap by inhalation daily. Yes Provider, Historical   FREESTYLE LITE STRIPS strip  11/30/19   Provider, Historical   FREESTYLE LANCETS 28 gauge misc  11/30/19   Provider, Historical   fluticasone propion-salmeteroL (ADVAIR/WIXELA) 100-50 mcg/dose diskus inhaler Take 1 Puff by inhalation every twelve (12) hours. Provider, Historical     No Known Allergies   Social History     Tobacco Use    Smoking status: Former    Smokeless tobacco: Never   Substance Use Topics    Alcohol use: Never      No family history on file.  . Reviewed Family history- no pertinent findings of relvance    Current Facility-Administered Medications   Medication Dose Route Frequency    furosemide (LASIX) tablet 40 mg  40 mg Oral BID    guaiFENesin ER (MUCINEX) tablet 1,200 mg  1,200 mg Oral Q12H    budesonide (PULMICORT) 250 mcg/2ml nebulizer susp  250 mcg Nebulization BID RT    And    arformoteroL (BROVANA) neb solution 15 mcg  15 mcg Nebulization BID RT    insulin glargine (LANTUS) injection 15 Units  15 Units SubCUTAneous DAILY    insulin glargine (LANTUS) injection 20 Units  20 Units SubCUTAneous QHS    cefTRIAXone (ROCEPHIN) 1 g in sterile water (preservative free) 10 mL IV syringe  1 g IntraVENous Q24H    apixaban (ELIQUIS) tablet 5 mg  5 mg Oral Q12H    sodium chloride (NS) flush 5-40 mL  5-40 mL IntraVENous Q8H    insulin lispro (HUMALOG) injection   SubCUTAneous AC&HS    pantoprazole (PROTONIX) tablet 40 mg  40 mg Oral ACB    albuterol-ipratropium (DUO-NEB) 2.5 MG-0.5 MG/3 ML  3 mL Nebulization Q6HWA RT    atenoloL (TENORMIN) tablet 25 mg  25 mg Oral DAILY    hydrALAZINE (APRESOLINE) tablet 50 mg  50 mg Oral TID    isosorbide dinitrate (ISORDIL) tablet 20 mg  20 mg Oral TID    rosuvastatin (CRESTOR) tablet 10 mg  10 mg Oral QHS         REVIEW OF SYSTEMS:       [x]Total of 12 systems reviewed as follows:     GENERAL:no fever and chills   HEENT: no sinus congestion / hearing or vision changes  NECK: No pain or stiffness. PULMONARY: no shortness of breath and cough ,no wheezing. Cardiovascular: denies palpitations or chest pain; no lower extremity edema  GASTROINTESTINAL: Denies abdominal pain, constipation, diarrhea, nausea, vomiting or melena / bloody stools  GENITOURINARY: No urinary frequency, urgency, hesitancy or dysuria. MUSCULOSKELETAL: no back / joint or muscle pain, no recent trauma. DERMATOLOGIC: denies pruritis, rashes or open areas   ENDOCRINE: No polyuria, polydipsia, no heat or cold intolerance. HEMATOLOGICAL: No anemia or easy bruising or bleeding. NEUROLOGIC:  no focal weakness,  no speech changes     Objective:   Vital Signs:    Visit Vitals  BP (!) 130/95 (BP 1 Location: Right upper arm, BP Patient Position: At rest)   Pulse 72   Temp 97.7 °F (36.5 °C)   Resp 18   Ht 5' 3\" (1.6 m)   Wt 78.9 kg (174 lb)   SpO2 96%   BMI 30.82 kg/m²       O2 Device: Nasal cannula   O2 Flow Rate (L/min): 2 l/min   Temp (24hrs), Av.4 °F (36.3 °C), Min:97 °F (36.1 °C), Max:97.7 °F (36.5 °C)       Intake/Output:   Last shift:       07 -  1900  In: 600 [P.O.:600]  Out: 1000 [Urine:1000]  Last 3 shifts:  190 -  0700  In: 1440 [P.O.:1440]  Out: 1400 [Urine:1400]    Intake/Output Summary (Last 24 hours) at 2022 1505  Last data filed at 2022 1308  Gross per 24 hour   Intake 1200 ml   Output 1400 ml   Net -200 ml        Physical Exam:   General:  Alert, cooperative, no distress, appears stated age. Head:  Normocephalic, without obvious abnormality, atraumatic. Eyes:  Conjunctivae/corneas clear. PERRL, EOMs intact. Nose: Nares normal. Septum midline. Mucosa normal. No drainage or sinus tenderness. Throat: Lips, mucosa, and tongue normal. Teeth and gums normal.   Neck: Supple, symmetrical, trachea midline, no adenopathy, thyroid: no enlargment/tenderness/nodules, no carotid bruit and no JVD. Back:   Symmetric, no curvature. ROM normal.   Lungs:   Clear to auscultation bilaterally. Chest wall:  No tenderness or deformity. Heart:  Regular rate and rhythm, S1, S2 normal, no murmur, click, rub or gallop. Abdomen:   Soft, non-tender. Bowel sounds normal. No masses,  No organomegaly. Extremities: Extremities normal, atraumatic, no cyanosis or edema. Pulses: 2+ and symmetric all extremities.    Skin: Skin color, texture, turgor normal. No rashes or lesions   Lymph nodes: Cervical, supraclavicular, and axillary nodes normal.   Neurologic: Grossly nonfocal     Data review:     Recent Results (from the past 24 hour(s))   GLUCOSE, POC    Collection Time: 09/29/22  4:16 PM   Result Value Ref Range    Glucose (POC) 109 70 - 110 mg/dL   GLUCOSE, POC    Collection Time: 09/29/22  9:16 PM   Result Value Ref Range    Glucose (POC) 163 (H) 70 - 110 mg/dL   MAGNESIUM    Collection Time: 09/30/22  3:29 AM   Result Value Ref Range    Magnesium 2.2 1.6 - 2.6 mg/dL   CBC W/O DIFF    Collection Time: 09/30/22  3:29 AM   Result Value Ref Range    WBC 6.4 4.6 - 13.2 K/uL    RBC 3.36 (L) 4.20 - 5.30 M/uL    HGB 10.2 (L) 12.0 - 16.0 g/dL    HCT 31.9 (L) 35.0 - 45.0 %    MCV 94.9 78.0 - 100.0 FL    MCH 30.4 24.0 - 34.0 PG    MCHC 32.0 31.0 - 37.0 g/dL    RDW 13.9 11.6 - 14.5 %    PLATELET 804 609 - 595 K/uL    MPV 9.8 9.2 - 11.8 FL    NRBC 0.0 0  WBC    ABSOLUTE NRBC 0.00 0.00 - 1.03 K/uL   METABOLIC PANEL, BASIC    Collection Time: 09/30/22  3:29 AM   Result Value Ref Range    Sodium 136 136 - 145 mmol/L    Potassium 4.8 3.5 - 5.5 mmol/L    Chloride 104 100 - 111 mmol/L    CO2 24 21 - 32 mmol/L    Anion gap 8 3.0 - 18 mmol/L    Glucose 139 (H) 74 - 99 mg/dL    BUN 70 (H) 7.0 - 18 MG/DL    Creatinine 2.55 (H) 0.6 - 1.3 MG/DL    BUN/Creatinine ratio 27 (H) 12 - 20      GFR est AA 22 (L) >60 ml/min/1.73m2    GFR est non-AA 18 (L) >60 ml/min/1.73m2    Calcium 8.9 8.5 - 10.1 MG/DL   PTT    Collection Time: 09/30/22 3:29 AM   Result Value Ref Range    aPTT 33.4 23.0 - 36.4 SEC   GLUCOSE, POC    Collection Time: 09/30/22  8:09 AM   Result Value Ref Range    Glucose (POC) 124 (H) 70 - 110 mg/dL   GLUCOSE, POC    Collection Time: 09/30/22 12:10 PM   Result Value Ref Range    Glucose (POC) 167 (H) 70 - 110 mg/dL       Imaging:  I have personally reviewed the patients radiographs and have reviewed the reports:  XR Results (most recent):  Results from Hospital Encounter encounter on 09/27/22    XR CHEST PORT    Narrative  EXAMINATION: Chest single view    INDICATION: Shortness of breath    COMPARISON: None    FINDINGS: Single frontal view the chest. Borderline prominent cardiac  silhouette. Aortic arch calcifications. Perihilar and beyond patchy hazy streaky  densities. No confluent consolidation. No evidence of pneumothorax. No obvious  acute osseous findings. Impression  Borderline prominent cardiac silhouette with mild patchy streaky infiltrate  versus edema. CT Results (most recent):  Results from Abstract encounter on 03/26/19    CT ABD PELV WO CONT    Impression  CT ABDOMEN PELVIS W/O IV CONTRAST (03/14/2019 2:35 PM EDT)  Impressions Performed At  IMPRESSION:  1. Copious stool suggests possible constipation; correlate with bowel  habits. 2. Large hiatus hernia. 3. Limited evaluation without IV contrast precludes definitive evaluation  of solid organs. Reading Doctor: Nat Meigs Signature by: Richard Vogt   [facility]      CT ABDOMEN PELVIS W/O IV CONTRAST (03/14/2019 2:35 PM EDT)  Narrative Performed At  History: Abdominal pain. TECHNIQUE: Helical noncontrast 2.31 mm images are obtained through the  abdomen and pelvis. Oral contrast administered. Multiplanar reformat images  are generated. Findings: There is a large hiatus hernia. The lung bases are clear. There  is a nonobstructive bowel gas pattern.     Copious stool suggests possible constipation; correlate with bowel habits. No free fluid or free air. No adenopathy. There is calcific aortoiliac  atherosclerosis. There is an indeterminate hypodense lesion in the left  kidney consistent with cyst unchanged since 9 May 2016. Without IV  contrast, definitive evaluation of solid organs is not achieved. The  appendix is normal. Bone window images reveal no significant focal  osteolytic or osteoblastic bone abnormalities. Age related degenerative  changes noted in the spine. [address] [facility]    CT ABDOMEN PELVIS W/O IV CONTRAST (03/14/2019 2:35 PM EDT)  Procedure Note  Interface, Radresults_Incoming - 03/14/2019 2:47 PM EDT  History: Abdominal pain. TECHNIQUE: Helical noncontrast 3.50 mm images are obtained through the  abdomen and pelvis. Oral contrast administered. Multiplanar reformat images  are generated. Findings: There is a large hiatus hernia. The lung bases are clear. There  is a nonobstructive bowel gas pattern. Copious stool suggests possible constipation; correlate with bowel habits. No free fluid or free air. No adenopathy. There is calcific aortoiliac  atherosclerosis. There is an indeterminate hypodense lesion in the left  kidney consistent with cyst unchanged since 9 May 2016. Without IV  contrast, definitive evaluation of solid organs is not achieved. The  appendix is normal. Bone window images reveal no significant focal  osteolytic or osteoblastic bone abnormalities. Age related degenerative  changes noted in the spine. IMPRESSION  IMPRESSION:  1. Copious stool suggests possible constipation; correlate with bowel  habits. 2. Large hiatus hernia. 3. Limited evaluation without IV contrast precludes definitive evaluation  of solid organs.         Reading Doctor: Aubrey Nicholas Signature by: Jeanie Hull    CT ABDOMEN PELVIS W/O IV CONTRAST (03/14/2019 2:35 PM EDT)  Performing Organization Address City/State/Zipcode Phone Number  [address] [facility]   [address] [facility] [place], [address] [facility] [phone]    ~~This report was copied and pasted from the [facility]. ~~         09/27/22    ECHO ADULT COMPLETE 09/28/2022 9/28/2022    Interpretation Summary    Left Ventricle: Normal left ventricular systolic function with a visually estimated EF of 60 - 65%. Left ventricle size is normal. Mildly increased wall thickness. Normal wall motion. Diastolic dysfunction present with normal LV EF. Aortic Valve: Tricuspid valve. Mild sclerosis of the aortic valve cusp. Mitral Valve: Mildly thickened leaflet. Mild annular calcification at the anterior and posterior leaflets of the mitral valve. Pulmonary Arteries: Pulmonary hypertension not present. The estimated PASP is 29 mmHg. Signed by: Yara Rossi MD on 9/28/2022 11:40 AM      Complex decision making was made in the evaluation and management plans during this consultation. More than 50% of time was spent in counseling and coordination of care including review of data and discussion with other team members.          Hiren Cheema MD  Pulmonary & Critical Care

## 2022-09-30 NOTE — PROGRESS NOTES
Discharge planning    Home oxygen orders and clinicals were faxed to [address] at [phone] via [employer] (T/A). Met with patient concerning home health orders. Patient stated \" I told you. I don't want home health. \"    CM will continue to assist with transition of care needs.      CIRILO Dow, RN  Pager # [phone]  Care Manager

## 2022-09-30 NOTE — PROGRESS NOTES
Bedside and Verbal shift change report given to [employer] (oncoming nurse) by [address] RN (offgoing nurse). Report included the following information SBAR, Kardex, Intake/Output, MAR, and Recent Results.

## 2022-09-30 NOTE — ROUTINE PROCESS
Received report from [facility]. Pt AAOx3, NAD, breathing non labored, on O2 NC at 2L, HOB up. IV site clean, dry and intact. Bed at the lowest level on lock position, call bell w/i reach. Wound Prevention Checklist    Patient: Ivan Brennan (77 y.o. female)  Date: 9/30/2022  Diagnosis: Acute CHF (congestive heart failure) ([facility]) [I50.9]  REMY (acute kidney injury) ([facility] [address] [N17.9] <principal problem not specified>    Precautions: Fall       []  Heel prevention boots placed on patient    []  Patient turned q2h during shift    []  Lift team ordered    []  Patient on Orient bed/Specialty bed    []  Each Wound is documented during shift (Stage, Color, drainage, odor, measurements, and dressings)    [x]  Dual skin check done with KEITH Cottrell RN . Bedside and Verbal shift change report given to KEITH Cottrell (oncoming nurse) by me (offgoing nurse).  Report included the following information SBAR, Kardex, Intake/Output, MAR, Recent Results, and Cardiac Rhythm SR .

## 2022-10-01 LAB
GLUCOSE BLD STRIP.AUTO-MCNC: 103 MG/DL (ref 70–110)
GLUCOSE BLD STRIP.AUTO-MCNC: 110 MG/DL (ref 70–110)
GLUCOSE BLD STRIP.AUTO-MCNC: 124 MG/DL (ref 70–110)
GLUCOSE BLD STRIP.AUTO-MCNC: 151 MG/DL (ref 70–110)

## 2022-10-01 PROCEDURE — 74011250637 HC RX REV CODE- 250/637: Performed by: INTERNAL MEDICINE

## 2022-10-01 PROCEDURE — 94640 AIRWAY INHALATION TREATMENT: CPT

## 2022-10-01 PROCEDURE — 94761 N-INVAS EAR/PLS OXIMETRY MLT: CPT

## 2022-10-01 PROCEDURE — 2709999900 HC NON-CHARGEABLE SUPPLY

## 2022-10-01 PROCEDURE — 74011636637 HC RX REV CODE- 636/637: Performed by: HOSPITALIST

## 2022-10-01 PROCEDURE — 82962 GLUCOSE BLOOD TEST: CPT

## 2022-10-01 PROCEDURE — 74011250637 HC RX REV CODE- 250/637: Performed by: HOSPITALIST

## 2022-10-01 PROCEDURE — 65270000046 HC RM TELEMETRY

## 2022-10-01 PROCEDURE — 74011000250 HC RX REV CODE- 250: Performed by: INTERNAL MEDICINE

## 2022-10-01 PROCEDURE — 99232 SBSQ HOSP IP/OBS MODERATE 35: CPT | Performed by: HOSPITALIST

## 2022-10-01 RX ORDER — CEFPODOXIME PROXETIL 200 MG/1
200 TABLET, FILM COATED ORAL EVERY 24 HOURS
Status: DISCONTINUED | OUTPATIENT
Start: 2022-10-01 | End: 2022-10-02

## 2022-10-01 RX ORDER — CEFPODOXIME PROXETIL 200 MG/1
200 TABLET, FILM COATED ORAL EVERY 12 HOURS
Status: DISCONTINUED | OUTPATIENT
Start: 2022-10-01 | End: 2022-10-01

## 2022-10-01 RX ADMIN — ATENOLOL 25 MG: 25 TABLET ORAL at 10:06

## 2022-10-01 RX ADMIN — ISOSORBIDE DINITRATE 20 MG: 10 TABLET ORAL at 21:58

## 2022-10-01 RX ADMIN — IPRATROPIUM BROMIDE AND ALBUTEROL SULFATE 3 ML: .5; 3 SOLUTION RESPIRATORY (INHALATION) at 14:04

## 2022-10-01 RX ADMIN — Medication 15 UNITS: at 10:05

## 2022-10-01 RX ADMIN — BUDESONIDE 250 MCG: 0.25 SUSPENSION RESPIRATORY (INHALATION) at 19:49

## 2022-10-01 RX ADMIN — BUDESONIDE 250 MCG: 0.25 SUSPENSION RESPIRATORY (INHALATION) at 10:07

## 2022-10-01 RX ADMIN — Medication 20 UNITS: at 21:57

## 2022-10-01 RX ADMIN — SODIUM CHLORIDE, PRESERVATIVE FREE 10 ML: 5 INJECTION INTRAVENOUS at 21:58

## 2022-10-01 RX ADMIN — FUROSEMIDE 40 MG: 40 TABLET ORAL at 17:26

## 2022-10-01 RX ADMIN — ARFORMOTEROL TARTRATE 15 MCG: 15 SOLUTION RESPIRATORY (INHALATION) at 10:07

## 2022-10-01 RX ADMIN — ARFORMOTEROL TARTRATE 15 MCG: 15 SOLUTION RESPIRATORY (INHALATION) at 19:49

## 2022-10-01 RX ADMIN — ISOSORBIDE DINITRATE 20 MG: 10 TABLET ORAL at 10:06

## 2022-10-01 RX ADMIN — ROSUVASTATIN CALCIUM 10 MG: 10 TABLET, FILM COATED ORAL at 21:58

## 2022-10-01 RX ADMIN — APIXABAN 5 MG: 5 TABLET, FILM COATED ORAL at 20:16

## 2022-10-01 RX ADMIN — PANTOPRAZOLE SODIUM 40 MG: 40 TABLET, DELAYED RELEASE ORAL at 10:06

## 2022-10-01 RX ADMIN — IPRATROPIUM BROMIDE AND ALBUTEROL SULFATE 3 ML: .5; 3 SOLUTION RESPIRATORY (INHALATION) at 19:49

## 2022-10-01 RX ADMIN — SODIUM CHLORIDE, PRESERVATIVE FREE 10 ML: 5 INJECTION INTRAVENOUS at 14:04

## 2022-10-01 RX ADMIN — GUAIFENESIN 1200 MG: 600 TABLET, EXTENDED RELEASE ORAL at 10:07

## 2022-10-01 RX ADMIN — IPRATROPIUM BROMIDE AND ALBUTEROL SULFATE 3 ML: .5; 3 SOLUTION RESPIRATORY (INHALATION) at 10:07

## 2022-10-01 RX ADMIN — CEFPODOXIME PROXETIL 200 MG: 200 TABLET, FILM COATED ORAL at 20:16

## 2022-10-01 RX ADMIN — ISOSORBIDE DINITRATE 20 MG: 10 TABLET ORAL at 16:31

## 2022-10-01 RX ADMIN — HYDRALAZINE HYDROCHLORIDE 50 MG: 50 TABLET, FILM COATED ORAL at 21:58

## 2022-10-01 RX ADMIN — GUAIFENESIN 1200 MG: 600 TABLET, EXTENDED RELEASE ORAL at 20:16

## 2022-10-01 RX ADMIN — HYDRALAZINE HYDROCHLORIDE 50 MG: 50 TABLET, FILM COATED ORAL at 10:06

## 2022-10-01 RX ADMIN — HYDRALAZINE HYDROCHLORIDE 50 MG: 50 TABLET, FILM COATED ORAL at 16:31

## 2022-10-01 RX ADMIN — SODIUM CHLORIDE, PRESERVATIVE FREE 10 ML: 5 INJECTION INTRAVENOUS at 07:39

## 2022-10-01 RX ADMIN — FUROSEMIDE 40 MG: 40 TABLET ORAL at 10:06

## 2022-10-01 RX ADMIN — APIXABAN 5 MG: 5 TABLET, FILM COATED ORAL at 10:07

## 2022-10-01 RX ADMIN — Medication 3 UNITS: at 12:00

## 2022-10-01 NOTE — PROGRESS NOTES
Problem: Falls - Risk of  Goal: *Absence of Falls  Description: Document Pippa Morrissey Fall Risk and appropriate interventions in the flowsheet. Outcome: Progressing Towards Goal  Note: Fall Risk Interventions:  Mobility Interventions: Assess mobility with egress test, Patient to call before getting OOB    Mentation Interventions: Adequate sleep, hydration, pain control    Medication Interventions: Assess postural VS orthostatic hypotension, Patient to call before getting OOB, Teach patient to arise slowly    Elimination Interventions: Call light in reach, Patient to call for help with toileting needs    History of Falls Interventions: Consult care management for discharge planning         Problem: Patient Education: Go to Patient Education Activity  Goal: Patient/Family Education  Outcome: Progressing Towards Goal     Problem: Tissue Perfusion - Cardiopulmonary, Altered  Goal: *Optimize tissue perfusion  Outcome: Progressing Towards Goal  Goal: *Absence of hypoxia  Outcome: Progressing Towards Goal     Problem: Patient Education: Go to Patient Education Activity  Goal: Patient/Family Education  Outcome: Progressing Towards Goal     Problem: Pressure Injury - Risk of  Goal: *Prevention of pressure injury  Description: Document David Scale and appropriate interventions in the flowsheet.   Outcome: Progressing Towards Goal  Note: Pressure Injury Interventions:       Moisture Interventions: Absorbent underpads, Maintain skin hydration (lotion/cream)    Activity Interventions: Increase time out of bed, Pressure redistribution bed/mattress(bed type)    Mobility Interventions: Assess need for specialty bed, HOB 30 degrees or less, Pressure redistribution bed/mattress (bed type)    Nutrition Interventions: Document food/fluid/supplement intake                     Problem: Patient Education: Go to Patient Education Activity  Goal: Patient/Family Education  Outcome: Progressing Towards Goal     Problem: Patient Education: Go to Patient Education Activity  Goal: Patient/Family Education  Outcome: Progressing Towards Goal     Problem: Patient Education: Go to Patient Education Activity  Goal: Patient/Family Education  Outcome: Progressing Towards Goal     Problem: Patient Education: Go to Patient Education Activity  Goal: Patient/Family Education  Outcome: Progressing Towards Goal     Problem: Heart Failure: Day 4  Goal: Off Pathway (Use only if patient is Off Pathway)  Outcome: Progressing Towards Goal  Goal: Activity/Safety  Outcome: Progressing Towards Goal  Goal: Diagnostic Test/Procedures  Outcome: Progressing Towards Goal  Goal: Nutrition/Diet  Outcome: Progressing Towards Goal  Goal: Discharge Planning  Outcome: Progressing Towards Goal  Goal: Medications  Outcome: Progressing Towards Goal  Goal: Respiratory  Outcome: Progressing Towards Goal  Goal: Treatments/Interventions/Procedures  Outcome: Progressing Towards Goal  Goal: Psychosocial  Outcome: Progressing Towards Goal  Goal: *Oxygen saturation within defined limits  Outcome: Progressing Towards Goal  Goal: *Hemodynamically stable  Outcome: Progressing Towards Goal  Goal: *Optimal pain control at patient's stated goal  Outcome: Progressing Towards Goal  Goal: *Anxiety reduced or absent  Outcome: Progressing Towards Goal  Goal: *Demonstrates progressive activity  Outcome: Progressing Towards Goal

## 2022-10-01 NOTE — PROGRESS NOTES
Problem: Falls - Risk of  Goal: *Absence of Falls  Description: Document [place] Fall Risk and appropriate interventions in the flowsheet. Outcome: Progressing Towards Goal  Note: Fall Risk Interventions:  Mobility Interventions: Assess mobility with egress test, Patient to call before getting OOB    Mentation Interventions: Adequate sleep, hydration, pain control, Door open when patient unattended, Evaluate medications/consider consulting pharmacy, Update white board, Toileting rounds    Medication Interventions: Assess postural VS orthostatic hypotension, Evaluate medications/consider consulting pharmacy, Teach patient to arise slowly    Elimination Interventions: Call light in reach, Elevated toilet seat, Patient to call for help with toileting needs, Toilet paper/wipes in reach    History of Falls Interventions: Consult care management for discharge planning, Door open when patient unattended, Evaluate medications/consider consulting pharmacy         Problem: Patient Education: Go to Patient Education Activity  Goal: Patient/Family Education  Outcome: Progressing Towards Goal     Problem: Tissue Perfusion - Cardiopulmonary, Altered  Goal: *Optimize tissue perfusion  Outcome: Progressing Towards Goal  Goal: *Absence of hypoxia  Outcome: Progressing Towards Goal     Problem: Patient Education: Go to Patient Education Activity  Goal: Patient/Family Education  Outcome: Progressing Towards Goal     Problem: Pressure Injury - Risk of  Goal: *Prevention of pressure injury  Description: Document David Scale and appropriate interventions in the flowsheet.   Outcome: Progressing Towards Goal  Note: Pressure Injury Interventions:       Moisture Interventions: Absorbent underpads, Minimize layers    Activity Interventions: Chair cushion, Pressure redistribution bed/mattress(bed type), PT/OT evaluation    Mobility Interventions: Chair cushion, Pressure redistribution bed/mattress (bed type), HOB 30 degrees or less, PT/OT evaluation    Nutrition Interventions: Document food/fluid/supplement intake                     Problem: Patient Education: Go to Patient Education Activity  Goal: Patient/Family Education  Outcome: Progressing Towards Goal     Problem: Patient Education: Go to Patient Education Activity  Goal: Patient/Family Education  Outcome: Progressing Towards Goal     Problem: Patient Education: Go to Patient Education Activity  Goal: Patient/Family Education  Outcome: Progressing Towards Goal     Problem: Patient Education: Go to Patient Education Activity  Goal: Patient/Family Education  Outcome: Progressing Towards Goal     Problem: Heart Failure: Day 4  Goal: Off Pathway (Use only if patient is Off Pathway)  Outcome: Progressing Towards Goal  Goal: Activity/Safety  Outcome: Progressing Towards Goal  Goal: Diagnostic Test/Procedures  Outcome: Progressing Towards Goal  Goal: Nutrition/Diet  Outcome: Progressing Towards Goal  Goal: Discharge Planning  Outcome: Progressing Towards Goal  Goal: Medications  Outcome: Progressing Towards Goal  Goal: Respiratory  Outcome: Progressing Towards Goal  Goal: Treatments/Interventions/Procedures  Outcome: Progressing Towards Goal  Goal: Psychosocial  Outcome: Progressing Towards Goal  Goal: *Oxygen saturation within defined limits  Outcome: Progressing Towards Goal  Goal: *Hemodynamically stable  Outcome: Progressing Towards Goal  Goal: *Optimal pain control at patient's stated goal  Outcome: Progressing Towards Goal  Goal: *Anxiety reduced or absent  Outcome: Progressing Towards Goal  Goal: *Demonstrates progressive activity  Outcome: Progressing Towards Goal

## 2022-10-01 NOTE — PROGRESS NOTES
[facility] Hospitalist Group  Progress Note    Patient: Krystal Killian Age: 80 y.o. : 1940 MR#: [id number] SSN: [id number]  Date/Time: 10/1/2022    Subjective:     Urine culture preliminary result available. Patient seen and examined at bedside, she hopes she can go home soon, but expresses understanding may not be until Monday. Assessment/Plan:     1. Dyspnea on exertion multifactorial, improving. 2. Cardiac catheterization 2022 with normal epicardial coronary arteries, normal LVEF 60% with normal wall motion. Nuclear stress test 2022 with moderate to large area of reversible ischemia extending from the septal wall across the anterior apical wall to the lateral wall. 3. L popliteal nonocclusive DVT by duplex US 2022. 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 4.  REMY on CKD 3. Nephrology follows. renal ultrasound no hydronephrosis. 5.  Acute respiratory failure with hypoxia, resting O2 sat 80%. Patient qualified for home oxygen, ordered. If she goes home , will need retesting. 6. DM2 with steroid-induced hyperglycemia. .  Lantus increased, sliding scale insulin, ADA diet. 7. dyslipidemia  8. Hx recurrent UTI's  9. Former tobacco.  10. Obesity Body mass index is 30.82 kg/m². 11. Hx of ischemic colitis . 12.  hx syncope with negative tilt table 2022  13. UTI. ceftriaxone. Culture prelim: Enterococcus, second gram-negative rhonda. 14. DVT prophylaxis  15. Full code. Discharge to home with home health and oxygen when ready. Additional Notes:      Case discussed with:  [x]Patient  []Family  [x]Nursing  [x]Case Management  DVT Prophylaxis:  []Lovenox  []Hep SQ  []SCDs  []Coumadin   []On Heparin gtt  [x] noac    Objective:   VS: Visit Vitals  BP (!) 132/55 (BP 1 Location: Right lower arm, BP Patient Position: Semi fowlers; Lying)   Pulse 75   Temp 97.3 °F (36.3 °C)   Resp 18   Ht 5' 3\" (1.6 m)   Wt 78.9 kg (174 lb)   SpO2 91%   BMI 30.82 kg/m²        Tmax/24hrs: Temp (24hrs), Av.6 °F (36.4 °C), Min:97.3 °F (36.3 °C), Max:97.8 °F (36.6 °C)    Input/Output:   Intake/Output Summary (Last 24 hours) at 10/1/2022 1516  Last data filed at 10/1/2022 1246  Gross per 24 hour   Intake 820 ml   Output 1700 ml   Net -880 ml       General: Awake alert and oriented x4. Appears several years younger than her stated age. Speaking in full sentences on supplemental oxygen. HEENT normocephalic atraumatic pupils equally round reactive to light. Oropharynx clear  Cardiovascular: Regular rate and rhythm. Pulmonary: Clear to auscultation bilaterally. No rhonchi. No wheeze.   GI: Soft nontender nondistended no  Extremities: Nonpitting edema bilaterally  Neuro no focal deficit  Additional: No rash to visible skin    Labs:    Recent Results (from the past 24 hour(s))   GLUCOSE, POC    Collection Time: 22  4:46 PM   Result Value Ref Range    Glucose (POC) 100 70 - 110 mg/dL   GLUCOSE, POC    Collection Time: 22 10:48 PM   Result Value Ref Range    Glucose (POC) 117 (H) 70 - 110 mg/dL   GLUCOSE, POC    Collection Time: 10/01/22  7:55 AM   Result Value Ref Range    Glucose (POC) 124 (H) 70 - 110 mg/dL   GLUCOSE, POC    Collection Time: 10/01/22 11:40 AM   Result Value Ref Range    Glucose (POC) 151 (H) 70 - 110 mg/dL     Additional Data Reviewed:      Signed By: Ha Barrett MD     2022 10:30 AM

## 2022-10-01 NOTE — PROGRESS NOTES
Pharmacy Note     Cefpodoxime 200mg q12h ordered for treatment of UTI. Per [facility] Policy, Cefpodoxime will be changed to 200 mg q12h. Estimated Creatinine Clearance: Estimated Creatinine Clearance: 17.9 mL/min (A) (based on SCr of 2.55 mg/dL (H)). Dialysis Status, REMY, CKD: -  BMI:  Body mass index is 31.24 kg/m². Rationale for Adjustment:  [facility] renal dosing policy. Pharmacy will continue to monitor and adjust dose as necessary. Please call with any questions.     Thank you,  John Reyes, [facility]

## 2022-10-02 LAB
BACTERIA SPEC CULT: ABNORMAL
BACTERIA SPEC CULT: ABNORMAL
CC UR VC: ABNORMAL
GLUCOSE BLD STRIP.AUTO-MCNC: 117 MG/DL (ref 70–110)
GLUCOSE BLD STRIP.AUTO-MCNC: 126 MG/DL (ref 70–110)
GLUCOSE BLD STRIP.AUTO-MCNC: 173 MG/DL (ref 70–110)
GLUCOSE BLD STRIP.AUTO-MCNC: 98 MG/DL (ref 70–110)
SERVICE CMNT-IMP: ABNORMAL

## 2022-10-02 PROCEDURE — 82962 GLUCOSE BLOOD TEST: CPT

## 2022-10-02 PROCEDURE — 74011000250 HC RX REV CODE- 250: Performed by: INTERNAL MEDICINE

## 2022-10-02 PROCEDURE — 94640 AIRWAY INHALATION TREATMENT: CPT

## 2022-10-02 PROCEDURE — 99232 SBSQ HOSP IP/OBS MODERATE 35: CPT | Performed by: HOSPITALIST

## 2022-10-02 PROCEDURE — 65270000046 HC RM TELEMETRY

## 2022-10-02 PROCEDURE — 74011636637 HC RX REV CODE- 636/637: Performed by: HOSPITALIST

## 2022-10-02 PROCEDURE — 74011250637 HC RX REV CODE- 250/637: Performed by: INTERNAL MEDICINE

## 2022-10-02 PROCEDURE — 94761 N-INVAS EAR/PLS OXIMETRY MLT: CPT

## 2022-10-02 PROCEDURE — 74011250637 HC RX REV CODE- 250/637: Performed by: HOSPITALIST

## 2022-10-02 RX ORDER — AMOXICILLIN 400 MG/5ML
875 POWDER, FOR SUSPENSION ORAL EVERY 12 HOURS
Status: DISCONTINUED | OUTPATIENT
Start: 2022-10-02 | End: 2022-10-03 | Stop reason: HOSPADM

## 2022-10-02 RX ORDER — AMOXICILLIN 875 MG/1
875 TABLET, FILM COATED ORAL EVERY 12 HOURS
Status: DISCONTINUED | OUTPATIENT
Start: 2022-10-02 | End: 2022-10-02

## 2022-10-02 RX ADMIN — Medication 3 UNITS: at 11:50

## 2022-10-02 RX ADMIN — GUAIFENESIN 1200 MG: 600 TABLET, EXTENDED RELEASE ORAL at 20:54

## 2022-10-02 RX ADMIN — GUAIFENESIN 1200 MG: 600 TABLET, EXTENDED RELEASE ORAL at 09:06

## 2022-10-02 RX ADMIN — HYDRALAZINE HYDROCHLORIDE 50 MG: 50 TABLET, FILM COATED ORAL at 21:57

## 2022-10-02 RX ADMIN — BUDESONIDE 250 MCG: 0.25 SUSPENSION RESPIRATORY (INHALATION) at 19:43

## 2022-10-02 RX ADMIN — Medication 15 UNITS: at 09:12

## 2022-10-02 RX ADMIN — FUROSEMIDE 40 MG: 40 TABLET ORAL at 17:10

## 2022-10-02 RX ADMIN — SODIUM CHLORIDE, PRESERVATIVE FREE 10 ML: 5 INJECTION INTRAVENOUS at 15:09

## 2022-10-02 RX ADMIN — PANTOPRAZOLE SODIUM 40 MG: 40 TABLET, DELAYED RELEASE ORAL at 09:06

## 2022-10-02 RX ADMIN — FUROSEMIDE 40 MG: 40 TABLET ORAL at 09:06

## 2022-10-02 RX ADMIN — HYDRALAZINE HYDROCHLORIDE 50 MG: 50 TABLET, FILM COATED ORAL at 09:06

## 2022-10-02 RX ADMIN — ISOSORBIDE DINITRATE 20 MG: 10 TABLET ORAL at 09:06

## 2022-10-02 RX ADMIN — ISOSORBIDE DINITRATE 20 MG: 10 TABLET ORAL at 17:10

## 2022-10-02 RX ADMIN — AMOXICILLIN 875 MG: 400 POWDER, FOR SUSPENSION ORAL at 21:57

## 2022-10-02 RX ADMIN — ISOSORBIDE DINITRATE 20 MG: 10 TABLET ORAL at 21:58

## 2022-10-02 RX ADMIN — ATENOLOL 25 MG: 25 TABLET ORAL at 09:05

## 2022-10-02 RX ADMIN — ACETAMINOPHEN 650 MG: 325 TABLET ORAL at 11:54

## 2022-10-02 RX ADMIN — SODIUM CHLORIDE, PRESERVATIVE FREE 10 ML: 5 INJECTION INTRAVENOUS at 22:00

## 2022-10-02 RX ADMIN — HYDRALAZINE HYDROCHLORIDE 50 MG: 50 TABLET, FILM COATED ORAL at 17:10

## 2022-10-02 RX ADMIN — Medication 20 UNITS: at 21:58

## 2022-10-02 RX ADMIN — APIXABAN 5 MG: 5 TABLET, FILM COATED ORAL at 20:54

## 2022-10-02 RX ADMIN — APIXABAN 5 MG: 5 TABLET, FILM COATED ORAL at 09:06

## 2022-10-02 RX ADMIN — ARFORMOTEROL TARTRATE 15 MCG: 15 SOLUTION RESPIRATORY (INHALATION) at 19:43

## 2022-10-02 RX ADMIN — ROSUVASTATIN CALCIUM 10 MG: 10 TABLET, FILM COATED ORAL at 21:57

## 2022-10-02 NOTE — PROGRESS NOTES
Called [facility]. Received on-call service. Connected with the  and inform he does not have orders to deliver oxygen.  More than likely the oxygen is still waiting on insurance approval.     [id number]- New walk test and home oxygen order faxed to [facility] at [address], BSN, RN  Care Management

## 2022-10-02 NOTE — PROGRESS NOTES
Problem: Falls - Risk of  Goal: *Absence of Falls  Description: Document Ale Bell Fall Risk and appropriate interventions in the flowsheet. Outcome: Progressing Towards Goal  Note: Fall Risk Interventions:  Mobility Interventions: Patient to call before getting OOB    Mentation Interventions: Adequate sleep, hydration, pain control    Medication Interventions: Patient to call before getting OOB, Teach patient to arise slowly    Elimination Interventions: Call light in reach, Patient to call for help with toileting needs    History of Falls Interventions: Door open when patient unattended         Problem: Tissue Perfusion - Cardiopulmonary, Altered  Goal: *Optimize tissue perfusion  Outcome: Progressing Towards Goal     Problem: Pressure Injury - Risk of  Goal: *Prevention of pressure injury  Description: Document Daivd Scale and appropriate interventions in the flowsheet. Outcome: Progressing Towards Goal  Note: Pressure Injury Interventions:       Moisture Interventions: Absorbent underpads    Activity Interventions: Increase time out of bed    Mobility Interventions: Pressure redistribution bed/mattress (bed type)    Nutrition Interventions: Document food/fluid/supplement intake                     Problem: Pressure Injury - Risk of  Goal: *Prevention of pressure injury  Description: Document David Scale and appropriate interventions in the flowsheet.   Outcome: Progressing Towards Goal  Note: Pressure Injury Interventions:       Moisture Interventions: Absorbent underpads    Activity Interventions: Increase time out of bed    Mobility Interventions: Pressure redistribution bed/mattress (bed type)    Nutrition Interventions: Document food/fluid/supplement intake                     Problem: Heart Failure: Day 4  Goal: Off Pathway (Use only if patient is Off Pathway)  Outcome: Progressing Towards Goal  Goal: Activity/Safety  Outcome: Progressing Towards Goal  Goal: Diagnostic Test/Procedures  Outcome: Progressing Towards Goal  Goal: Nutrition/Diet  Outcome: Progressing Towards Goal  Goal: Discharge Planning  Outcome: Progressing Towards Goal  Goal: Medications  Outcome: Progressing Towards Goal  Goal: Respiratory  Outcome: Progressing Towards Goal  Goal: Treatments/Interventions/Procedures  Outcome: Progressing Towards Goal  Goal: Psychosocial  Outcome: Progressing Towards Goal  Goal: *Oxygen saturation within defined limits  Outcome: Progressing Towards Goal  Goal: *Hemodynamically stable  Outcome: Progressing Towards Goal  Goal: *Optimal pain control at patient's stated goal  Outcome: Progressing Towards Goal  Goal: *Anxiety reduced or absent  Outcome: Progressing Towards Goal  Goal: *Demonstrates progressive activity  Outcome: Progressing Towards Goal

## 2022-10-02 NOTE — ROUTINE PROCESS
Wound Prevention Checklist    Patient: Shira Burk (77 y.o. female)  Date: 10/2/2022  Diagnosis: Acute CHF (congestive heart failure) ([facility] [address] [I50.9]  [facility] [N17.9] <principal problem not specified>    Precautions: Fall       []  Heel prevention boots placed on patient    []  Patient turned q2h during shift    []  Lift team ordered    []  Patient on Andrea bed/Specialty bed    []  Each Wound is documented during shift (Stage, Color, drainage, odor, measurements, and dressings)    [x]  Dual skin check done with KEITH Busby RN

## 2022-10-02 NOTE — ROUTINE PROCESS
Bedside shift change report given to KEITH Kaur (oncoming nurse) by Farhad Dixon RN (offgoing nurse). Report included the following information SBAR, Kardex, Intake/Output, and MAR.

## 2022-10-02 NOTE — PROGRESS NOTES
Walk test completed. Resting Room Air O2 86%. Patient placed on 2L NC. O2 92%. Patient resting comfortably.

## 2022-10-02 NOTE — ROUTINE PROCESS
Bedside and Verbal shift change report given to Cricket Kessler RN (oncoming nurse) by Otoniel Dooley RN  (offgoing nurse). Report included the following information SBAR, Kardex, MAR, and Recent Results.

## 2022-10-02 NOTE — PROGRESS NOTES
[facility]    Patient: Holden Schafer Age: 80 y.o. : 1940 MR#: [id number] SSN: [id number]  Date/Time: 10/2/2022    Subjective:     Urine culture resulted. Oxygen reordered based on new walk test, as original order had . Patient seen and examined at bedside, she reports she overall feels better. Dysuria improved. Pharmacy is [facility], and [facility], and she uses [facility] as well. Hopes she can go home tomorrow. Assessment/Plan:     1. Dyspnea on exertion multifactorial, improving. 2. Cardiac catheterization 2022 with normal epicardial coronary arteries, normal LVEF 60% with normal wall motion. Nuclear stress test 2022 with moderate to large area of reversible ischemia extending from the septal wall across the anterior apical wall to the lateral wall. 3. L popliteal nonocclusive DVT by duplex US 2022. 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 4.  REMY on CKD 3. Nephrology follows. renal ultrasound no hydronephrosis. 5.  Acute respiratory failure with hypoxia, resting O2 sat 80%. Patient qualified for home oxygen, ordered. If she goes home , will need retesting. 6. DM2 with steroid-induced hyperglycemia. .  Lantus increased, sliding scale insulin, ADA diet. 7. dyslipidemia  8. Hx recurrent UTI's. estrogen cream at discharge. 5.  Former tobacco.  10. Obesity Body mass index is 31.24 kg/m². 11. Hx of ischemic colitis . 12.  hx syncope with negative tilt table 2022  13. UTI, Enterococcus and E. coli. Amoxicillin based on sensitivity. 14. DVT prophylaxis  15. Full code. Discharge to home with home health and oxygen when ready.     Additional Notes:      Case discussed with:  [x]Patient  []Family  [x]Nursing  [x]Case Management  DVT Prophylaxis:  []Lovenox  []Hep SQ  []SCDs  []Coumadin   []On Heparin gtt  [x] noac    Objective:   VS: Visit Vitals  /71 (BP 1 Location: Left upper arm, BP Patient Position: Semi fowlers; Lying)   Pulse 66   Temp 97.8 °F (36.6 °C)   Resp 20   Ht 5' 3\" (1.6 m)   Wt 80 kg (176 lb 5.9 oz)   SpO2 98%   BMI 31.24 kg/m²        Tmax/24hrs: Temp (24hrs), Av.5 °F (36.4 °C), Min:97.2 °F (36.2 °C), Max:97.8 °F (36.6 °C)    Input/Output:   Intake/Output Summary (Last 24 hours) at 10/2/2022 1743  Last data filed at 10/2/2022 1711  Gross per 24 hour   Intake 770 ml   Output 1250 ml   Net -480 ml       General: Awake alert and oriented x4. Appears several years younger than her stated age. Speaking in full sentences on supplemental oxygen. HEENT normocephalic atraumatic pupils equally round reactive to light. Oropharynx clear  Cardiovascular: Regular rate and rhythm. Pulmonary: Clear to auscultation bilaterally. No rhonchi. No wheeze.   GI: Soft nontender nondistended no  Extremities: Nonpitting edema bilaterally  Neuro no focal deficit  Additional: No rash to visible skin    Labs:    Recent Results (from the past 24 hour(s))   GLUCOSE, POC    Collection Time: 10/01/22  9:50 PM   Result Value Ref Range    Glucose (POC) 110 70 - 110 mg/dL   GLUCOSE, POC    Collection Time: 10/02/22  8:02 AM   Result Value Ref Range    Glucose (POC) 117 (H) 70 - 110 mg/dL   GLUCOSE, POC    Collection Time: 10/02/22 11:39 AM   Result Value Ref Range    Glucose (POC) 173 (H) 70 - 110 mg/dL   GLUCOSE, POC    Collection Time: 10/02/22  3:34 PM   Result Value Ref Range    Glucose (POC) 98 70 - 110 mg/dL     Additional Data Reviewed:      Signed By: King Maxi MD     2022 10:30 AM

## 2022-10-03 VITALS
SYSTOLIC BLOOD PRESSURE: 112 MMHG | HEIGHT: 63 IN | OXYGEN SATURATION: 96 % | BODY MASS INDEX: 31.25 KG/M2 | DIASTOLIC BLOOD PRESSURE: 65 MMHG | HEART RATE: 76 BPM | RESPIRATION RATE: 20 BRPM | WEIGHT: 176.37 LBS | TEMPERATURE: 97.4 F

## 2022-10-03 LAB
GLUCOSE BLD STRIP.AUTO-MCNC: 129 MG/DL (ref 70–110)
GLUCOSE BLD STRIP.AUTO-MCNC: 166 MG/DL (ref 70–110)

## 2022-10-03 PROCEDURE — 97116 GAIT TRAINING THERAPY: CPT

## 2022-10-03 PROCEDURE — 99239 HOSP IP/OBS DSCHRG MGMT >30: CPT | Performed by: HOSPITALIST

## 2022-10-03 PROCEDURE — 74011250637 HC RX REV CODE- 250/637: Performed by: HOSPITALIST

## 2022-10-03 PROCEDURE — 94761 N-INVAS EAR/PLS OXIMETRY MLT: CPT

## 2022-10-03 PROCEDURE — 74011636637 HC RX REV CODE- 636/637: Performed by: HOSPITALIST

## 2022-10-03 PROCEDURE — 74011250637 HC RX REV CODE- 250/637: Performed by: INTERNAL MEDICINE

## 2022-10-03 PROCEDURE — 94640 AIRWAY INHALATION TREATMENT: CPT

## 2022-10-03 PROCEDURE — 77010033678 HC OXYGEN DAILY

## 2022-10-03 PROCEDURE — 74011000250 HC RX REV CODE- 250: Performed by: INTERNAL MEDICINE

## 2022-10-03 PROCEDURE — 82962 GLUCOSE BLOOD TEST: CPT

## 2022-10-03 RX ORDER — AMOXICILLIN 400 MG/5ML
875 POWDER, FOR SUSPENSION ORAL EVERY 12 HOURS
Qty: 130.8 ML | Refills: 0 | Status: SHIPPED | OUTPATIENT
Start: 2022-10-03 | End: 2022-10-09

## 2022-10-03 RX ORDER — FUROSEMIDE 40 MG/1
40 TABLET ORAL 2 TIMES DAILY
Qty: 60 TABLET | Refills: 0 | Status: SHIPPED | OUTPATIENT
Start: 2022-10-03

## 2022-10-03 RX ORDER — GUAIFENESIN 600 MG/1
600 TABLET, EXTENDED RELEASE ORAL EVERY 12 HOURS
Qty: 10 TABLET | Refills: 0 | Status: SHIPPED | OUTPATIENT
Start: 2022-10-03 | End: 2022-10-08

## 2022-10-03 RX ADMIN — ARFORMOTEROL TARTRATE 15 MCG: 15 SOLUTION RESPIRATORY (INHALATION) at 08:03

## 2022-10-03 RX ADMIN — ATENOLOL 25 MG: 25 TABLET ORAL at 08:39

## 2022-10-03 RX ADMIN — HYDRALAZINE HYDROCHLORIDE 50 MG: 50 TABLET, FILM COATED ORAL at 08:40

## 2022-10-03 RX ADMIN — Medication 15 UNITS: at 08:56

## 2022-10-03 RX ADMIN — FUROSEMIDE 40 MG: 40 TABLET ORAL at 08:39

## 2022-10-03 RX ADMIN — APIXABAN 5 MG: 5 TABLET, FILM COATED ORAL at 08:40

## 2022-10-03 RX ADMIN — BUDESONIDE 250 MCG: 0.25 SUSPENSION RESPIRATORY (INHALATION) at 08:03

## 2022-10-03 RX ADMIN — AMOXICILLIN 875 MG: 400 POWDER, FOR SUSPENSION ORAL at 09:00

## 2022-10-03 RX ADMIN — SODIUM CHLORIDE, PRESERVATIVE FREE 10 ML: 5 INJECTION INTRAVENOUS at 05:59

## 2022-10-03 RX ADMIN — PANTOPRAZOLE SODIUM 40 MG: 40 TABLET, DELAYED RELEASE ORAL at 08:40

## 2022-10-03 RX ADMIN — IPRATROPIUM BROMIDE AND ALBUTEROL SULFATE 3 ML: .5; 3 SOLUTION RESPIRATORY (INHALATION) at 08:03

## 2022-10-03 RX ADMIN — GUAIFENESIN 1200 MG: 600 TABLET, EXTENDED RELEASE ORAL at 08:40

## 2022-10-03 RX ADMIN — ISOSORBIDE DINITRATE 20 MG: 10 TABLET ORAL at 08:39

## 2022-10-03 RX ADMIN — IPRATROPIUM BROMIDE AND ALBUTEROL SULFATE 3 ML: .5; 3 SOLUTION RESPIRATORY (INHALATION) at 13:40

## 2022-10-03 RX ADMIN — Medication 3 UNITS: at 08:56

## 2022-10-03 RX ADMIN — SODIUM CHLORIDE, PRESERVATIVE FREE 10 ML: 5 INJECTION INTRAVENOUS at 14:00

## 2022-10-03 NOTE — PROGRESS NOTES
Problem: Mobility Impaired (Adult and Pediatric)  Goal: *Acute Goals and Plan of Care (Insert Text)  Description: Physical Therapy Goals  Initiated 9/28/2022 and to be accomplished within 7 day(s)  1. Patient will move from supine to sit and sit to supine  in bed with independence. 2.  Patient will transfer from bed to chair and chair to bed with independence. 3.  Patient will perform sit to stand with independence. 4.  Patient will ambulate with independence for 200 feet with the least restrictive device. 5.  Patient will ascend/descend 3 stairs with 1 handrail(s) with supervision/set-up. PLOF: independent with mobility without an assistive device, several steps to enter home, sons live on either side of her and are able to assist as needed     Outcome: Resolved/Met   PHYSICAL THERAPY TREATMENT AND DISCHARGE    Patient: Jose De Jesus Moreno (77 y.o. female)  Date: 10/3/2022  Diagnosis: Acute CHF (congestive heart failure) ([facility] [address] [I50.9]  [facility] [N17.9]   Precautions: Fall  ASSESSMENT:  Seated in bed. Reports amb in room and completed ADLs in bathroom this AM. Denies mobility concerns with return home. Declines trial of stairs. Mod  I for bed mobility. Independent for amb in room. Good safety awareness. Steady gait. Returned to seated in bed. Call bell in reach. PLAN:  Further skilled physical therapy is not indicated at this time. Rationale for discharge:  [x]     Goals Achieved  [x]     Plateau Reached  []     Patient not participating in therapy  []     Other:    Further Equipment Recommendations for Discharge:  None    [facility] Basic Mobility Inpatient Short Form:  20/20, with stairs omitted  This [facility] score should be considered in conjunction with interdisciplinary team recommendations to determine the most appropriate discharge setting. Patient's social support, diagnosis, medical stability, and prior level of function should also be taken into consideration.      At this time and based on an AM-PAC score of 20/20 if omitting stairs, no further PT is recommended upon discharge. SUBJECTIVE:   Patient stated My son is on his way.     OBJECTIVE DATA SUMMARY:   Critical Behavior:  Neurologic State: Alert  Orientation Level: Oriented X4  Cognition: Follows commands  Safety/Judgement: Fall prevention  Psychosocial  Patient Behaviors: Calm; Cooperative  Functional Mobility Training:  Bed Mobility:  Supine to Sit: Modified independent  Sit to Supine: Modified independent  Transfers:  Sit to Stand: Modified independent  Stand to Sit: Modified independent  Balance:   Sitting: Intact  Standing: Intact  Ambulation/Gait Training:  Distance (ft): 15 Feet (ft)   Ambulation - Level of Assistance: Independent    Pain:  Pain level pre-treatment: 0/10   Pain level post-treatment: 0/10     Activity Tolerance:   Good    After treatment:   [] Patient left in no apparent distress sitting up in chair  [x] Patient left in no apparent distress in bed  [x] Call bell left within reach  [x] Nursing notified  [] Caregiver present  [] Bed alarm activated  [] SCDs applied      COMMUNICATION/EDUCATION:   [x]         Role of physical therapy in the acute care setting. [x]         Fall prevention education was provided and the patient/caregiver indicated understanding. [x]         Patient/family have participated as able and agree with findings and recommendations. []         Patient is unable to participate in plan of care at this time.        Yony Donovan, PT   Time Calculation: 8 mins    [address] [place] [address] [address] [address] AM-PAC® Basic Mobility Inpatient Short Form (6-Clicks) Version 2    How much HELP from another person does the patient currently need    (If the patient hasn't done an activity recently, how much help from another person do you think he/she would need if he/she tried?)   Total (Total A or Dep)   A Lot  (Mod to Max A)   A Little (Sup or Min A)   None (Mod I to I)   Turning from your back to your side while in a flat bed without using bedrails? [] 1 [] 2 [] 3 [x] 4   2. Moving from lying on your back to sitting on the side of a flat bed without using bedrails? [] 1 [] 2 [] 3 [x] 4   3. Moving to and from a bed to a chair (including a wheelchair)? [] 1 [] 2 [] 3 [x] 4   4. Standing up from a chair using your arms (e.g., wheelchair, or bedside chair)? [] 1 [] 2 [] 3 [x] 4   5. Walking in hospital room? [] 1 [] 2 [] 3 [x] 4   6. Climbing 3-5 steps with a railing?+  Not tested d/t patient declines [] 1 [] 2 [] 3 [] 4   +If stair climbing cannot be assessed, skip item #6. Sum responses from items 1-5.

## 2022-10-03 NOTE — PROGRESS NOTES
Problem: Falls - Risk of  Goal: *Absence of Falls  Description: Document Toshia Litter Fall Risk and appropriate interventions in the flowsheet. Outcome: Progressing Towards Goal  Note: Fall Risk Interventions:  Mobility Interventions: Patient to call before getting OOB    Mentation Interventions: Adequate sleep, hydration, pain control    Medication Interventions: Teach patient to arise slowly    Elimination Interventions: Call light in reach    History of Falls Interventions: Door open when patient unattended         Problem: Tissue Perfusion - Cardiopulmonary, Altered  Goal: *Optimize tissue perfusion  Outcome: Progressing Towards Goal     Problem: Pressure Injury - Risk of  Goal: *Prevention of pressure injury  Description: Document David Scale and appropriate interventions in the flowsheet.   Outcome: Progressing Towards Goal  Note: Pressure Injury Interventions:       Moisture Interventions: Absorbent underpads    Activity Interventions: Increase time out of bed    Mobility Interventions: Pressure redistribution bed/mattress (bed type)    Nutrition Interventions: Document food/fluid/supplement intake

## 2022-10-03 NOTE — PROGRESS NOTES
Discharge planning    Spoke with [address] [place] [address] with [employer] concerning oxygen delivery.  Per [address] [place] [address],  is getting to leave the office to deliver oxygen to the hospital.    .KATRINA BloomN, RN  Pager # [phone]  Care Manager

## 2022-10-03 NOTE — PROGRESS NOTES
In Patient Progress note    Admit Date: 9/27/2022    Impression:     #1 Acute kidney injury on chronic kidney disease stage 4 , resolved, creat close to baseline . REMY thought to   Be secondary to cardiorenal syndrome   #2 chronic kidney disease stage IV suspect diabetic nephropathy and hypertension nephrosclerosis, sub nephrotic proteinuria  #3 hypertension appears to be reasonably controlled  #4 chronic diastolic CHF appears to be compensated, restart maintenance  lasix   #5 COPD and asthma, no acute exacerbation   #6 diabetes mellitus type 2  #7 history of DVT/PE on Eliquis, 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  #8 history of psoriasis     Plan:  #1 strict intake output charting  #2 Lasix 40 mg PO BID , daily wts , fluid restrict 1200 ml/day  #3 echo report reviewed  #4 monitor renal functions and electrolytes  #5 evaluation of exertional dyspnea and hypoxia per primary team  #6 blood pressure is in good range, hold lisinopril   #7 avoid IV contrast nephrotoxins and renally dose medications for EGFR of less than 30     Discussed with patient  Needs follow up with her nephrologist after discharge ,    Please call with questions,     Carlotta Whitney MD FASN  Cell [phone]  Pager: [phone]        Subjective:     - respiratory - stable  - hemodynamics - stable, no pressrs  - UOP-ok  - Nutrition -ok    Objective:     Visit Vitals  /65   Pulse 76   Temp 97.4 °F (36.3 °C)   Resp 20   Ht 5' 3\" (1.6 m)   Wt 80 kg (176 lb 5.9 oz)   SpO2 96%   BMI 31.24 kg/m²         Intake/Output Summary (Last 24 hours) at 10/3/2022 1237  Last data filed at 10/3/2022 0844  Gross per 24 hour   Intake 780 ml   Output 900 ml   Net -120 ml         Physical Exam:     Patient is in no apparent distress. HEENT: Head is normocephalic and atraumatic. Neck: no cervical lymphadenopathy or thyromegaly. Lungs: good air entry, clear to auscultation bilaterally. Trachea at the midline. Cardiovascular system: S1, S2, regular rate and rhythm. No murmurs, gallops or rubs. No jvd. Abdomen: soft, non tender, non distended. Extremities: trace park edema      Data Review:    No results for input(s): WBC, RBC, HCT, MCV, MCH, MCHC, RDW, HCTEXT, HCTEXT in the last 72 hours. No lab exists for component: HEMOGLOBIN, PLATELET, MPV    No results for input(s): BUN, CREA, CA, ALB, K, NA, CL, CO2, PHOS, GLU in the last 72 hours.       Lucy Vega MD

## 2022-10-03 NOTE — ROUTINE PROCESS
Bedside and Verbal shift change report given to Kimani Hogan RN (oncoming nurse) by Homar Ford RN  (offgoing nurse). Report included the following information SBAR, Kardex, MAR, and Recent Results.

## 2022-10-03 NOTE — DIABETES MGMT
Diabetes/ Glycemic Control Plan of Care    Patient with history of T2DM on Lantus insulin 15 units daily every morning and 26 units daily at bedtime was admitted on 9/27/2022 with report of worsening shortness of breath. 10/03/2022: Noted disch to home today. Recommendations:   1.) disch diabetes med: cont on home diabetes med: Lantus insulin 15 units daily every morning and 20 units daily at bedtime. Assessment:   POC BG 10/02/2022: 117, 173, 98, 126  POC BG 10/03/2022: 166, 129    DX:   1. Shortness of breath [R06.02 (ICD-10-CM)]           Fasting/ Morning blood glucose:   Lab Results   Component Value Date/Time    Glucose 139 (H) 09/30/2022 03:29 AM    Glucose (POC) 129 (H) 10/03/2022 11:37 AM    Glucose (POC) 149 (H) 06/16/2020 11:41 AM     IV Fluids containing dextrose: none    Steroids:  None    Blood glucose values: Within target range (70-180mg/dL):  NO    Current insulin orders:   Basal lantus insulin 15 units daily every morning  Basal lantus insulin 20 units daily at bedtime  Correctional lispro insulin. Very resistant dose    Total Daily Dose previous 24 hours: 38 units of insulin  Lantus: 35 units  Lispro: 3 units    Current A1c:   Lab Results   Component Value Date/Time    Hemoglobin A1c 6.1 (H) 09/27/2022 07:08 PM      equivalent  to ave Blood Glucose of 128 mg/dl for 2-3 months prior to admission    Adequate glycemic control PTA: YES    Nutrition/Diet:   Active Orders   Diet    ADULT DIET Regular; 3 carb choices (45 gm/meal); Low Fat/Low Chol/High Fiber/JACQUE; No Salt Added (3-4 gm); Low Potassium (Less than 3000 mg/day);  Low Phosphorus (Less than 1000 mg); 1200 ml      Meal Intake:  Patient Vitals for the past 168 hrs:   % Diet Eaten   10/03/22 0844 1 - 25%   10/02/22 1711 51 - 75%   10/02/22 1536 26 - 50%   10/02/22 0952 51 - 75%   10/02/22 0800 1 - 25%   10/01/22 1246 76 - 100%   10/01/22 0845 1 - 25%   09/30/22 1719 51 - 75%   09/30/22 1308 51 - 75%   09/30/22 0914 51 - 75% 09/29/22 1718 76 - 100%   09/29/22 1313 76 - 100%   09/28/22 1336 76 - 100%   09/28/22 0838 51 - 75%       Supplement Intake:  Patient Vitals for the past 168 hrs:   Supplement intake %   09/28/22 0838 0%         Home diabetes medications: Verified with patient on 9/29/2022  Key Antihyperglycemic Medications               insulin glargine (LANTUS) 100 unit/mL injection (Taking) by SubCUTAneous route nightly. 26 units in AM, 15 units in PM            Plan/Goals:   Blood glucose will be within target of 70 - 180 mg/dl within 72 hours  Reinforce dietary and medication compliance at home.             Education:  [] Refer to Diabetes Education Record                       [x] Education not indicated at this time     Jesica Longo RN [facility]  Office: [phone]

## 2022-10-03 NOTE — DISCHARGE SUMMARY
Discharge Summary    Patient: Elpidio Dubose MRN: [id number]  CSN: [id number]    YOB: 1940  Age: 80 y.o. Sex: female    DOA: 9/27/2022 LOS:  LOS: 6 days   Discharge Date:      Admission Diagnoses: Acute CHF (congestive heart failure) ([facility] [I50.9]  REMY (acute kidney injury) ([facility] [N17.9]    Discharge Diagnoses:    Problem List as of 10/3/2022 Date Reviewed: 6/5/2020            Codes Class Noted - Resolved    Acute CHF (congestive heart failure) ([facility] ICD-10-CM: I50.9  ICD-9-CM: 428.0  9/27/2022 - Present        REMY (acute kidney injury) ([facility] ICD-10-CM: N17.9  ICD-9-CM: 584.9  9/27/2022 - Present        Lower GI bleed ICD-10-CM: K92.2  ICD-9-CM: 578.9  6/2/2020 - Present        Severe obesity ([facility] ICD-10-CM: E66.01  ICD-9-CM: 278.01  10/16/2019 - Present         Reason for Admission  80 y.o. female with medical co-morbidities including hypertension, hyperlipidemia, chronic CHF unspecified, COPD, h/o PE/DVT on Eliquis, current IVC filter, type 2 diabetes mellitus, psoriasis on Humira, presented to [facility] with worsening shortness of breath. According to her, she had shortness of breath which started a few weeks prior that seem to have worsened in the last few days. Her shortness of breath seemed to be worse when she exerted herself. She had no chest pain, no associated leg swelling. She stated that she was compliant with all her medications. However, she noted that the bottle of Lasix was hidden behind the  the last 3 weeks and she hadn't realized it was missing, Hence, her Lasix dose was not filled in her medication box. Otherwise, she did not notice any changes in her leg swelling or urinary output. She does follow-up with her nephrologist regularly with indication that her kidney function has been stable. In the ED at the [facility], she was found to have elevated creatinine (4.13) with a depressed GFR, elevated BUN (67), elevated potassium.   Normal troponin. Respiratory panel was negative. CBC was within normal limit. Chest x-ray with pulmonary vascular congestion. Duplex of her legs showed nonoccluding DVT within the left popliteal vein. She was started on heparin drip. She was planned for transfer to [facility] for further evaluation of her CHF and REMY. She was given Lasix at the advice of the transferring MD.  upon arrival, she reported improvement in her respiratory symptom. Discharge Condition: Good    PHYSICAL EXAM   Visit Vitals  /71   Pulse 74   Temp 97.3 °F (36.3 °C)   Resp 20   Ht 5' 3\" (1.6 m)   Wt 80 kg (176 lb 5.9 oz)   SpO2 96%   BMI 31.24 kg/m²        General: Awake alert and oriented x4. Appears several years younger than her stated age. Speaking in full sentences on supplemental oxygen. HEENT normocephalic atraumatic pupils equally round reactive to light. Oropharynx clear  Cardiovascular: Regular rate and rhythm. Pulmonary: Clear to auscultation bilaterally. No rhonchi. No wheeze. GI: Soft nontender nondistended no  Extremities: Nonpitting edema bilaterally  Neuro no focal deficit  Additional: No rash to visible skin      Hospital Course:   1. Dyspnea on exertion multifactorial, improving. She did qualify for home oxygen. 2. Cardiac catheterization 6/8/2022 with normal epicardial coronary arteries, normal LVEF 60% with normal wall motion. Nuclear stress test 6/6/2022 with moderate to large area of reversible ischemia extending from the septal wall across the anterior apical wall to the lateral wall. 3. L popliteal nonocclusive DVT by duplex US 9/26/2022. 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 4.  REMY on CKD 3, improved. Nephrology followed in consult. renal ultrasound no hydronephrosis. Lasix dose increased. 5.  Acute respiratory failure with hypoxia, resting O2 sat 80%. Patient qualified for home oxygen, this was delivered prior to her discharge. 6. DM2 with steroid-induced hyperglycemia. Gladys [place] Medications as below. 7. dyslipidemia  8. Hx recurrent UTI's. estrogen cream at discharge. 5.  Former tobacco.  10. Obesity Body mass index is 31.24 kg/m². 11. Hx of ischemic colitis 2020. 12.  hx syncope with negative tilt table 06/2022  13. UTI, Enterococcus and E. coli. Amoxicillin based on sensitivity. 14. Full code. Discharge to home with home health and oxygen today. Patient agrees, all questions answered to the best of my ability.        Consults: Cardiology, Nephrology, and Pulmonary/Critical Care    Significant Diagnostic Studies: labs:     Lab Results   Component Value Date/Time    WBC 6.4 09/30/2022 03:29 AM    HGB 10.2 (L) 09/30/2022 03:29 AM    HCT 31.9 (L) 09/30/2022 03:29 AM    PLATELET 231 34/47/9652 03:29 AM    MCV 94.9 09/30/2022 03:29 AM     Lab Results   Component Value Date/Time    Sodium 136 09/30/2022 03:29 AM    Potassium 4.8 09/30/2022 03:29 AM    Chloride 104 09/30/2022 03:29 AM    CO2 24 09/30/2022 03:29 AM    Anion gap 8 09/30/2022 03:29 AM    Glucose 139 (H) 09/30/2022 03:29 AM    BUN 70 (H) 09/30/2022 03:29 AM    Creatinine 2.55 (H) 09/30/2022 03:29 AM    BUN/Creatinine ratio 27 (H) 09/30/2022 03:29 AM    GFR est AA 22 (L) 09/30/2022 03:29 AM    GFR est non-AA 18 (L) 09/30/2022 03:29 AM    Calcium 8.9 09/30/2022 03:29 AM     Results       Procedure Component Value Units Date/Time    CULTURE, URINE [[id number]]  (Abnormal)  (Susceptibility) Collected: 09/28/22 3041    Order Status: Completed Specimen: Urine from Clean catch Updated: 10/02/22 6332     Special Requests: NO SPECIAL REQUESTS        [place] Count --        >100,000  COLONIES/mL       Culture result: ENTEROCOCCUS FAECALIS         ESCHERICHIA COLI       Susceptibility        Enterococcus faecalis      CONSTANCE      Ampicillin ($) Susceptible      Ciprofloxacin ($) Susceptible      Daptomycin ($$$$$) Susceptible      Levofloxacin ($) Susceptible      Linezolid ($$$$$) Susceptible      Nitrofurantoin Susceptible Tetracycline Susceptible      Vancomycin ($) Susceptible                       Susceptibility        Escherichia coli      CONSTANCE      Amikacin ($) Susceptible      Ampicillin ($) Susceptible      Ampicillin/sulbactam ($) Susceptible      Cefazolin ($) Susceptible      Cefepime ($$) Susceptible      Cefoxitin Susceptible      Ceftazidime ($) Susceptible      Ceftriaxone ($) Susceptible      Ciprofloxacin ($) Susceptible      Gentamicin ($) Susceptible      Levofloxacin ($) Susceptible      Meropenem ($$) Susceptible      Nitrofurantoin Susceptible      Piperacillin/Tazobac ($) Susceptible      Tobramycin ($) Susceptible      Trimeth/Sulfa Susceptible                                 IMAGING  XR Results (most recent):  Results from Hospital Encounter encounter on 09/27/22    XR CHEST PORT    Narrative  EXAMINATION: Chest single view    INDICATION: Shortness of breath    COMPARISON: None    FINDINGS: Single frontal view the chest. Borderline prominent cardiac  silhouette. Aortic arch calcifications. Perihilar and beyond patchy hazy streaky  densities. No confluent consolidation. No evidence of pneumothorax. No obvious  acute osseous findings. Impression  Borderline prominent cardiac silhouette with mild patchy streaky infiltrate  versus edema. US Results (most recent):  Results from [place] encounter on 09/27/22    US RETROPERITONEUM COMP    Narrative  EXAMINATION: Ultrasound retroperitoneum    INDICATION: Acute renal injury    COMPARISON: None    TECHNIQUE: Grayscale and color sonographic images of the retroperitoneum. FINDINGS:    Right kidney: 9.2 cm length. Increased echotexture. No hydronephrosis. 0.9 x 0.8  cm cyst in the mid kidney. No suspicious lesions. Bladder: Unremarkable. Left kidney: 9.1 cm length. Increased echotexture. No hydronephrosis. 3.5 x 2.4  cm upper pole cyst.    Impression  Evidence of medical renal disease without obstructive uropathy.  Renal cysts  noted. 09/27/22    ECHO ADULT COMPLETE 09/28/2022 9/28/2022    Interpretation Summary    Left Ventricle: Normal left ventricular systolic function with a visually estimated EF of 60 - 65%. Left ventricle size is normal. Mildly increased wall thickness. Normal wall motion. Diastolic dysfunction present with normal LV EF. Aortic Valve: Tricuspid valve. Mild sclerosis of the aortic valve cusp. Mitral Valve: Mildly thickened leaflet. Mild annular calcification at the anterior and posterior leaflets of the mitral valve. Pulmonary Arteries: Pulmonary hypertension not present. The estimated PASP is 29 mmHg. Signed by: Hung Doyle MD on 9/28/2022 11:40 AM    Discharge Medications:     Current Discharge Medication List        START taking these medications    Details   amoxicillin (AMOXIL) 400 mg/5 mL suspension Take 10.9 mL by mouth every twelve (12) hours for 12 doses. First dose 10/3/22 evening. Qty: 130.8 mL, Refills: 0      guaiFENesin ER (MUCINEX) 600 mg ER tablet Take 1 Tablet by mouth every twelve (12) hours for 5 days. Qty: 10 Tablet, Refills: 0      L. acidophilus,casei,rhamnosus (Bio-K plus) 50 billion cell cpDR capsule Take 1 Capsule by mouth daily for 10 days. Qty: 10 Capsule, Refills: 0      conjugated estrogens 0.625 mg/gram vaginal cream  Commonly known as: PREMARIN    Insert 0.5 g into vagina daily. CONTINUE these medications which have CHANGED    Details   furosemide (LASIX) 40 mg tablet Take 1 Tablet by mouth two (2) times a day. Qty: 60 Tablet, Refills: 0           CONTINUE these medications which have NOT CHANGED    Details   hydrALAZINE (APRESOLINE) 50 mg tablet Take 50 mg by mouth three (3) times daily. isosorbide dinitrate (ISORDIL) 20 mg tablet Take 20 mg by mouth three (3) times daily. apixaban (ELIQUIS) 5 mg tablet Take 1 Tab by mouth two (2) times a day.   Qty: 60 Tab, Refills: 2      albuterol-ipratropium (DUO-NEB) 2.5 mg-0.5 mg/3 ml nebu 3 mL by Nebulization route every six (6) hours as needed for Wheezing. Qty: 30 Nebule, Refills: 2      ergocalciferol (ERGOCALCIFEROL) 50,000 unit capsule Vitamin D2 1,250 mcg (50,000 unit) capsule      albuterol (PROVENTIL HFA, VENTOLIN HFA, PROAIR HFA) 90 mcg/actuation inhaler Take 1 Puff by inhalation every six (6) hours as needed. rosuvastatin (CRESTOR) 10 mg tablet Take 10 mg by mouth nightly. insulin glargine (LANTUS) 100 unit/mL injection by SubCUTAneous route nightly. 26 units in AM, 15 units in PM      atenolol (TENORMIN) 25 mg tablet Take 25 mg by mouth daily. omeprazole (PRILOSEC) 20 mg capsule Take 20 mg by mouth daily. adalimumab (HUMIRA) 40 mg/0.8 mL injection pen 40 mg by SubCUTAneous route every seven (7) days. tiotropium (SPIRIVA) 18 mcg inhalation capsule Take 1 Cap by inhalation daily. FREESTYLE LITE STRIPS strip       FREESTYLE LANCETS 28 gauge misc       fluticasone propion-salmeteroL (ADVAIR/WIXELA) 100-50 mcg/dose diskus inhaler Take 1 Puff by inhalation every twelve (12) hours. STOP taking these medications       amLODIPine (NORVASC) 10 mg tablet Comments:   Reason for Stopping:         HYDROcodone-acetaminophen (NORCO)  mg tablet Comments:   Reason for Stopping:         lisinopril (PRINIVIL, ZESTRIL) 10 mg tablet Comments:   Reason for Stopping:               Activity: PT/OT per Home Health    Diet: Cardiac Diet and Diabetic Diet    Wound Care: None needed    Follow-up:   Follow-up Appointments   Procedures    FOLLOW UP VISIT Appointment in: Other (Specify) 1. Pcp 3 - 5 days 2. Nephrologist 4 - 6 weeks. 1. Pcp 3 - 5 days  2. Nephrologist 4 - 6 weeks. Standing Status:   Standing     Number of Occurrences:   1     Order Specific Question:   Appointment in     Answer:    Other (Specify)         Minutes spent on discharge: >30

## 2022-10-03 NOTE — PROGRESS NOTES
Discharge planning    Spoke with Cade Kam. Per Radha, oxygen order was processed and will deliver today. She could not provide a specific time for delivery. Updated Dr. Kranthi Ordonez via [employer] communication    ESTUARDO Velarde, BSN, RN  Pager # [phone]  Care Manager

## 2022-10-03 NOTE — PROGRESS NOTES
Discharge planning    Discharge order noted for today. Met with patient and son and are agreeable to the transition plan today. Transport has been arranged with son. Kiara Paula Updated bedside RN, Melvina Lopez,  to the transition plan.   Discharge information has been documented on the AVS.       KATRINA CarlosN, RN  Pager # [phone]  Care Manager

## 2022-10-03 NOTE — ROUTINE PROCESS
Wound Prevention Checklist    Patient: Krystyna Settmarcellus (77 y.o. female)  Date: 10/3/2022  Diagnosis: Acute CHF (congestive heart failure) ([facility] [I50.9]  REMY (acute kidney injury) ([facility] [N17.9] <principal problem not specified>    Precautions: Fall       []  Heel prevention boots placed on patient    []  Patient turned q2h during shift    []  Lift team ordered    []  Patient on Andrea bed/Specialty bed    []  Each Wound is documented during shift (Stage, Color, drainage, odor, measurements, and dressings)    [x]  Dual skin check done with KEITH Blanchard RN

## 2022-10-13 NOTE — PROGRESS NOTES
Physician Progress Note      PATIENT:               Yisel Whitman  CSN #:                  [id number]  :                       1940  ADMIT DATE:       2022 6:22 PM  [address] DATE:        10/3/2022 4:36 PM  RESPONDING  PROVIDER #:        Philipp Tobias MD          QUERY TEXT:    Dr. Kaylyn Fuller  Patient admitted with   worsening   SOB. Documentation reflects this patient is in acute on chronic CHF, likely HFpEF   in H&P on . This diagnosis was not noted again by attending. If possible, please document in the progress notes and discharge summary if :      The medical record reflects the following:  Risk Factors: However, she has noted that the bottle of Lasix was hidden behind the  the last 3 weeks and she has not realized it was missing, Hence, her Lasix dose was not filled in her medication box. Clinical Indicators: per  H&P-   + JVD, + pedal edema ; Chest x-ray with pulmonary vascular congestion. BNP   328;  per cards-  Chest x-ray reportedly with increased pulmonary vascular congestion, however on exam, she does not appear to be volume overloaded to me. I do not feel that she has decompensated heart failure. Her shortness of breath is likely noncardiac in nature. pulm  consult- Acute on chronic shortness of breath-predominant component of decompensated heart failure secondary to patient not taking Lasix  echo- Left Ventricle Normal left ventricular systolic function with a visually estimated EF of 60 - 65%. Left ventricle size is normal. Mildly increased wall thickness. Normal wall motion. Diastolic dysfunction present with normal LV EF. Treatment: Lasix  40mg  IV  x 1   then   40mg  po  qd.     Thank you,   [email or web address]  Options provided:  -- Acute on chronic CHF, likely HFpEF  confirmed after study  -- Chronic  diastolic   CHF  only;   acute  diastolic  CHF ruled out  -- Other - I will add my own diagnosis  -- Disagree - Not applicable / Not valid  -- Disagree - Clinically unable to determine / Unknown  -- Refer to Clinical Documentation Reviewer    PROVIDER RESPONSE TEXT:    Acute on chronic CHF, likely HFpEF confirmed after study.     Query created by: Desmond Florentino on 10/7/2022 7:22 AM      Electronically signed by:  Gt Parra MD 10/13/2022 4:33 PM

## 2022-10-18 ENCOUNTER — FOLLOW UP (OUTPATIENT)
Dept: URBAN - NONMETROPOLITAN AREA CLINIC 4 | Facility: CLINIC | Age: 82
End: 2022-10-18

## 2022-10-18 DIAGNOSIS — E11.9: ICD-10-CM

## 2022-10-18 DIAGNOSIS — H35.373: ICD-10-CM

## 2022-10-18 PROCEDURE — 92134 CPTRZ OPH DX IMG PST SGM RTA: CPT

## 2022-10-18 PROCEDURE — 92014 COMPRE OPH EXAM EST PT 1/>: CPT

## 2022-10-18 ASSESSMENT — TONOMETRY
OD_IOP_MMHG: 14
OS_IOP_MMHG: 13

## 2022-10-18 ASSESSMENT — VISUAL ACUITY
OS_CC: 20/40
OU_CC: 20/40
OS_PH: 20/30

## 2024-02-06 ENCOUNTER — COMPREHENSIVE EXAM (OUTPATIENT)
Dept: URBAN - NONMETROPOLITAN AREA CLINIC 4 | Facility: CLINIC | Age: 84
End: 2024-02-06

## 2024-02-06 DIAGNOSIS — H52.4: ICD-10-CM

## 2024-02-06 DIAGNOSIS — H35.373: ICD-10-CM

## 2024-02-06 DIAGNOSIS — E11.9: ICD-10-CM

## 2024-02-06 DIAGNOSIS — Z96.1: ICD-10-CM

## 2024-02-06 DIAGNOSIS — H34.11: ICD-10-CM

## 2024-02-06 PROCEDURE — 92014 COMPRE OPH EXAM EST PT 1/>: CPT

## 2024-02-06 PROCEDURE — 92134 CPTRZ OPH DX IMG PST SGM RTA: CPT

## 2024-02-06 ASSESSMENT — VISUAL ACUITY: OS_CC: 20/25

## 2024-02-06 ASSESSMENT — TONOMETRY
OD_IOP_MMHG: 12
OS_IOP_MMHG: 13

## 2025-02-11 ENCOUNTER — COMPREHENSIVE EXAM (OUTPATIENT)
Age: 85
End: 2025-02-11

## 2025-02-11 DIAGNOSIS — Z96.1: ICD-10-CM

## 2025-02-11 DIAGNOSIS — H35.373: ICD-10-CM

## 2025-02-11 DIAGNOSIS — H52.4: ICD-10-CM

## 2025-02-11 DIAGNOSIS — H34.11: ICD-10-CM

## 2025-02-11 DIAGNOSIS — E11.9: ICD-10-CM

## 2025-02-11 PROCEDURE — 99214 OFFICE O/P EST MOD 30 MIN: CPT
